# Patient Record
Sex: FEMALE | Race: WHITE | NOT HISPANIC OR LATINO | Employment: OTHER | URBAN - METROPOLITAN AREA
[De-identification: names, ages, dates, MRNs, and addresses within clinical notes are randomized per-mention and may not be internally consistent; named-entity substitution may affect disease eponyms.]

---

## 2017-01-09 ENCOUNTER — HOSPITAL ENCOUNTER (OUTPATIENT)
Dept: MAMMOGRAPHY | Facility: HOSPITAL | Age: 67
Discharge: HOME | End: 2017-01-09
Admitting: INTERNAL MEDICINE

## 2017-01-09 ENCOUNTER — TELEPHONE (OUTPATIENT)
Dept: FAMILY MEDICINE CLINIC | Facility: CLINIC | Age: 67
End: 2017-01-09

## 2017-01-09 ENCOUNTER — HOSPITAL ENCOUNTER (OUTPATIENT)
Dept: ULTRASOUND IMAGING | Facility: HOSPITAL | Age: 67
Discharge: HOME | End: 2017-01-09

## 2017-01-09 DIAGNOSIS — R92.8 ABNORMAL ULTRASOUND OF BREAST: Primary | ICD-10-CM

## 2017-01-09 DIAGNOSIS — Z13.9 SCREENING: ICD-10-CM

## 2017-01-09 PROCEDURE — 76642 ULTRASOUND BREAST LIMITED: CPT

## 2017-01-09 NOTE — TELEPHONE ENCOUNTER
Spoke with Matilda at [facility], [place], patient needs order for Left Breast US limited.   Order put in    ----- Message from Taty Hitchcock sent at 1/9/2017  9:57 AM EST -----  Contact: [facility]  THE PT WAS NOT SUPPOSE TO GET A SCREENING DONE UNTIL July.  PER  THE NOTES SAY SHE SHOULD HAVE A ULTRASOUND DONE TODAY.    CAN YOU PLEASE PUT IN AN ORDER FOR AN ULTRASOUND?

## 2017-04-26 ENCOUNTER — OFFICE VISIT (OUTPATIENT)
Dept: FAMILY MEDICINE CLINIC | Facility: CLINIC | Age: 67
End: 2017-04-26

## 2017-04-26 VITALS
SYSTOLIC BLOOD PRESSURE: 128 MMHG | HEART RATE: 52 BPM | RESPIRATION RATE: 18 BRPM | WEIGHT: 178.4 LBS | DIASTOLIC BLOOD PRESSURE: 70 MMHG | TEMPERATURE: 97.7 F | BODY MASS INDEX: 30.46 KG/M2 | HEIGHT: 64 IN

## 2017-04-26 DIAGNOSIS — E03.9 ACQUIRED HYPOTHYROIDISM: ICD-10-CM

## 2017-04-26 DIAGNOSIS — E78.5 HYPERLIPIDEMIA, UNSPECIFIED HYPERLIPIDEMIA TYPE: Primary | ICD-10-CM

## 2017-04-26 DIAGNOSIS — N18.2 CHRONIC KIDNEY DISEASE, STAGE II (MILD): ICD-10-CM

## 2017-04-26 LAB
ALBUMIN SERPL-MCNC: 4.5 G/DL (ref 3.5–5.2)
ALBUMIN/GLOB SERPL: 1.9 G/DL
ALP SERPL-CCNC: 49 U/L (ref 39–117)
ALT SERPL-CCNC: 17 U/L (ref 1–33)
APPEARANCE UR: CLEAR
AST SERPL-CCNC: 16 U/L (ref 1–32)
BASOPHILS # BLD AUTO: 0.03 10*3/MM3 (ref 0–0.2)
BASOPHILS NFR BLD AUTO: 0.6 % (ref 0–1.5)
BILIRUB SERPL-MCNC: 0.4 MG/DL (ref 0.1–1.2)
BILIRUB UR QL STRIP: NEGATIVE
BUN SERPL-MCNC: 20 MG/DL (ref 8–23)
BUN/CREAT SERPL: 17.4 (ref 7–25)
CALCIUM SERPL-MCNC: 10.9 MG/DL (ref 8.6–10.5)
CHLORIDE SERPL-SCNC: 108 MMOL/L (ref 98–107)
CHOLEST SERPL-MCNC: 156 MG/DL (ref 0–200)
CO2 SERPL-SCNC: 27.1 MMOL/L (ref 22–29)
COLOR UR: YELLOW
CREAT SERPL-MCNC: 1.15 MG/DL (ref 0.57–1)
EOSINOPHIL # BLD AUTO: 0.21 10*3/MM3 (ref 0–0.7)
EOSINOPHIL NFR BLD AUTO: 4.5 % (ref 0.3–6.2)
ERYTHROCYTE [DISTWIDTH] IN BLOOD BY AUTOMATED COUNT: 14.4 % (ref 11.7–13)
GLOBULIN SER CALC-MCNC: 2.4 GM/DL
GLUCOSE SERPL-MCNC: 109 MG/DL (ref 65–99)
GLUCOSE UR QL: NEGATIVE
HCT VFR BLD AUTO: 44.5 % (ref 35.6–45.5)
HDLC SERPL-MCNC: 42 MG/DL (ref 40–60)
HGB BLD-MCNC: 13.1 G/DL (ref 11.9–15.5)
HGB UR QL STRIP: NEGATIVE
IMM GRANULOCYTES # BLD: 0 10*3/MM3 (ref 0–0.03)
IMM GRANULOCYTES NFR BLD: 0 % (ref 0–0.5)
KETONES UR QL STRIP: NEGATIVE
LDLC SERPL CALC-MCNC: 76 MG/DL (ref 0–100)
LDLC/HDLC SERPL: 1.8 {RATIO}
LEUKOCYTE ESTERASE UR QL STRIP: NEGATIVE
LYMPHOCYTES # BLD AUTO: 1.95 10*3/MM3 (ref 0.9–4.8)
LYMPHOCYTES NFR BLD AUTO: 41.7 % (ref 19.6–45.3)
MCH RBC QN AUTO: 30.6 PG (ref 26.9–32)
MCHC RBC AUTO-ENTMCNC: 29.4 G/DL (ref 32.4–36.3)
MCV RBC AUTO: 104 FL (ref 80.5–98.2)
MONOCYTES # BLD AUTO: 0.34 10*3/MM3 (ref 0.2–1.2)
MONOCYTES NFR BLD AUTO: 7.3 % (ref 5–12)
NEUTROPHILS # BLD AUTO: 2.15 10*3/MM3 (ref 1.9–8.1)
NEUTROPHILS NFR BLD AUTO: 45.9 % (ref 42.7–76)
NITRITE UR QL STRIP: NEGATIVE
PH UR STRIP: 6.5 [PH] (ref 5–8)
PLATELET # BLD AUTO: 183 10*3/MM3 (ref 140–500)
POTASSIUM SERPL-SCNC: 5.3 MMOL/L (ref 3.5–5.2)
PROT SERPL-MCNC: 6.9 G/DL (ref 6–8.5)
PROT UR QL STRIP: NEGATIVE
RBC # BLD AUTO: 4.28 10*6/MM3 (ref 3.9–5.2)
SODIUM SERPL-SCNC: 147 MMOL/L (ref 136–145)
SP GR UR: 1.01 (ref 1–1.03)
TRIGL SERPL-MCNC: 191 MG/DL (ref 0–150)
TSH SERPL DL<=0.005 MIU/L-ACNC: 0.85 MIU/ML (ref 0.27–4.2)
UROBILINOGEN UR STRIP-MCNC: NORMAL MG/DL
VLDLC SERPL CALC-MCNC: 38.2 MG/DL (ref 5–40)
WBC # BLD AUTO: 4.68 10*3/MM3 (ref 4.5–10.7)

## 2017-04-26 PROCEDURE — 99214 OFFICE O/P EST MOD 30 MIN: CPT | Performed by: INTERNAL MEDICINE

## 2017-04-26 RX ORDER — LEVOTHYROXINE SODIUM 0.1 MG/1
100 TABLET ORAL DAILY
Qty: 90 TABLET | Refills: 1 | Status: SHIPPED | OUTPATIENT
Start: 2017-04-26 | End: 2017-04-29 | Stop reason: SDUPTHER

## 2017-04-26 RX ORDER — SIMVASTATIN 40 MG
40 TABLET ORAL NIGHTLY
Qty: 90 TABLET | Refills: 1 | Status: SHIPPED | OUTPATIENT
Start: 2017-04-26 | End: 2017-10-27 | Stop reason: SDUPTHER

## 2017-04-26 NOTE — PROGRESS NOTES
Subjective   Rosalina Ferrell is a 67 y.o. female. Patient is here today for   Chief Complaint   Patient presents with   • Hospital Follow Up     in [place] - [facility]           Vitals:    04/26/17 0837   BP: 128/70   Pulse: 52   Resp: 18   Temp: 97.7 °F (36.5 °C)     The following portions of the patient's history were reviewed and updated as appropriate: allergies, current medications, past family history, past medical history, past social history, past surgical history and problem list.    Past Medical History:   Diagnosis Date   • Hyperlipidemia    • Hypothyroidism    • Urinary incontinence       No Known Allergies   Social History     Social History   • Marital status:      Spouse name: N/A   • Number of children: N/A   • Years of education: N/A     Occupational History   • Not on file.     Social History Main Topics   • Smoking status: Never Smoker   • Smokeless tobacco: Not on file   • Alcohol use No   • Drug use: Not on file   • Sexual activity: Not on file     Other Topics Concern   • Not on file     Social History Narrative        Current Outpatient Prescriptions:   •  aspirin 81 MG tablet, Take 81 mg by mouth Daily., Disp: , Rfl:   •  divalproex (DEPAKOTE) 250 MG EC tablet, Take by mouth., Disp: , Rfl:   •  divalproex (DEPAKOTE) 500 MG 24 hr tablet, Take 1.5 tablets by mouth., Disp: , Rfl:   •  levothyroxine (SYNTHROID, LEVOTHROID) 100 MCG tablet, Take 1 tablet by mouth Daily., Disp: 90 tablet, Rfl: 1  •  lithium (ESKALITH) 450 MG CR tablet, Take by mouth., Disp: , Rfl:   •  lithium (LITHOBID) 300 MG CR tablet, Take by mouth., Disp: , Rfl:   •  pantoprazole (PROTONIX) 40 MG EC tablet, Take 1 tablet by mouth Daily., Disp: 30 tablet, Rfl: 0  •  propranolol (INDERAL) 40 MG tablet, Take by mouth., Disp: , Rfl:   •  simvastatin (ZOCOR) 40 MG tablet, Take 1 tablet by mouth Every Night., Disp: 90 tablet, Rfl: 1     Objective     History of Present Illness Rosalina was admitted to the hospital with a viral  respiratory infection and dehydration last December when she was out of town.  She recovered without incident.  She has hypothyroidism, chronic kidney disease, gastroesophageal reflux, hypertension, hyperlipidemia, and bipolar illness on lithium and Depakote.  She does see psychiatry regularly and gets labs done.  She reports stable blood pressure readings.  She tries to eat healthy and is physically active.  She does complain of frequent urination and states that she drinks 3 L of water a day.  She gets up to urinate 10 times a night.  She did see urology a couple years ago who diagnosed her with a neurogenic bladder.  She did start try some Mybetric which did not help.    Review of Systems   Constitutional: Negative for activity change and unexpected weight change.   Respiratory: Negative.    Cardiovascular: Negative.    Genitourinary: Positive for frequency.       Physical Exam   Constitutional: She appears well-developed and well-nourished.   Neck: Neck supple. No thyromegaly present.   Cardiovascular: Normal rate, regular rhythm and normal heart sounds.    Pulmonary/Chest: Effort normal and breath sounds normal.   Neurological: She is alert.   Psychiatric: She has a normal mood and affect.   Vitals reviewed.      ASSESSMENT     Problem List Items Addressed This Visit        Cardiovascular and Mediastinum    Hyperlipidemia - Primary    Relevant Medications    simvastatin (ZOCOR) 40 MG tablet    Other Relevant Orders    Lipid Panel With LDL / HDL Ratio       Endocrine    Hypothyroidism    Relevant Medications    levothyroxine (SYNTHROID, LEVOTHROID) 100 MCG tablet    Other Relevant Orders    TSH       Genitourinary    Chronic kidney disease, stage II (mild)    Relevant Orders    CBC & Differential    Comprehensive Metabolic Panel    Urinalysis With / Microscopic If Indicated          PLAN  Patient Instructions   As you're fasting will check some labs today.  Discussed urinary frequency.  Will review your previous  urologic workup.  Suggest that you decrease your water intake to before bedtime and limit your water intake at nighttime.    Return in about 6 months (around 10/26/2017) for labs CBC, CMP, TSH, lipid, urinalysis.

## 2017-04-26 NOTE — PATIENT INSTRUCTIONS
As you're fasting will check some labs today.  Discussed urinary frequency.  Will review your previous urologic workup.  Suggest that you decrease your water intake to before bedtime and limit your water intake at nighttime.

## 2017-04-28 ENCOUNTER — TELEPHONE (OUTPATIENT)
Dept: FAMILY MEDICINE CLINIC | Facility: CLINIC | Age: 67
End: 2017-04-28

## 2017-04-28 NOTE — TELEPHONE ENCOUNTER
I called patient and notified her, per Dr. Stoddard, that her labs were stable and she just needed a recheck in 6 months.  Obie understood.

## 2017-05-01 RX ORDER — LEVOTHYROXINE SODIUM 0.1 MG/1
TABLET ORAL
Qty: 90 TABLET | Refills: 1 | Status: SHIPPED | OUTPATIENT
Start: 2017-05-01 | End: 2017-12-12 | Stop reason: SDUPTHER

## 2017-06-28 ENCOUNTER — HOSPITAL ENCOUNTER (OUTPATIENT)
Dept: MAMMOGRAPHY | Facility: HOSPITAL | Age: 67
Discharge: HOME | End: 2017-06-28
Admitting: INTERNAL MEDICINE

## 2017-06-28 PROCEDURE — G0202 SCR MAMMO BI INCL CAD: HCPCS

## 2017-10-31 RX ORDER — SIMVASTATIN 40 MG
TABLET ORAL
Qty: 90 TABLET | Refills: 0 | Status: SHIPPED | OUTPATIENT
Start: 2017-10-31 | End: 2017-12-12 | Stop reason: SDUPTHER

## 2017-11-03 ENCOUNTER — OFFICE VISIT (OUTPATIENT)
Dept: FAMILY MEDICINE CLINIC | Facility: CLINIC | Age: 67
End: 2017-11-03

## 2017-11-03 VITALS
BODY MASS INDEX: 31.34 KG/M2 | WEIGHT: 183.6 LBS | RESPIRATION RATE: 16 BRPM | HEIGHT: 64 IN | TEMPERATURE: 98.3 F | SYSTOLIC BLOOD PRESSURE: 120 MMHG | DIASTOLIC BLOOD PRESSURE: 72 MMHG | HEART RATE: 58 BPM | OXYGEN SATURATION: 98 %

## 2017-11-03 DIAGNOSIS — Z78.0 POST-MENOPAUSAL: ICD-10-CM

## 2017-11-03 DIAGNOSIS — Z00.00 MEDICARE ANNUAL WELLNESS VISIT, SUBSEQUENT: Primary | ICD-10-CM

## 2017-11-03 PROCEDURE — G0439 PPPS, SUBSEQ VISIT: HCPCS | Performed by: NURSE PRACTITIONER

## 2017-11-03 RX ORDER — INFLUENZA A VIRUS A/MICHIGAN/45/2015 X-275 (H1N1) ANTIGEN (FORMALDEHYDE INACTIVATED), INFLUENZA A VIRUS A/SINGAPORE/INFIMH-16-0019/2016 IVR-186 (H3N2) ANTIGEN (FORMALDEHYDE INACTIVATED), AND INFLUENZA B VIRUS B/MARYLAND/15/2016 BX-69A (A B/COLORADO/6/2017-LIKE VIRUS) ANTIGEN (FORMALDEHYDE INACTIVATED) 60; 60; 60 UG/.5ML; UG/.5ML; UG/.5ML
INJECTION, SUSPENSION INTRAMUSCULAR
COMMUNITY
Start: 2017-10-07 | End: 2018-02-12

## 2017-11-03 NOTE — PROGRESS NOTES
QUICK REFERENCE INFORMATION:  The ABCs of the Annual Wellness Visit    Subsequent Medicare Wellness Visit    HEALTH RISK ASSESSMENT    1950    Recent Hospitalizations:  Recently treated at the following:  Other: [place], dehydration, r/o stroke .        Current Medical Providers:  Patient Care Team:  Kavon Wheat MD as PCP - General (Internal Medicine)        Smoking Status:  History   Smoking Status   • Never Smoker   Smokeless Tobacco   • Never Used       Alcohol Consumption:  History   Alcohol Use No       Depression Screen:   PHQ-2/PHQ-9 Depression Screening 11/3/2017   Little interest or pleasure in doing things 0   Feeling down, depressed, or hopeless 0   Trouble falling or staying asleep, or sleeping too much 0   Feeling tired or having little energy 1   Poor appetite or overeating 0   Feeling bad about yourself - or that you are a failure or have let yourself or your family down 0   Trouble concentrating on things, such as reading the newspaper or watching television 0   Moving or speaking so slowly that other people could have noticed. Or the opposite - being so fidgety or restless that you have been moving around a lot more than usual 0   Thoughts that you would be better off dead, or of hurting yourself in some way 0   Total Score 1       Health Habits and Functional and Cognitive Screening:  Functional & Cognitive Status 11/3/2017   Do you have difficulty preparing food and eating? No   Do you have difficulty bathing yourself, getting dressed or grooming yourself? No   Do you have difficulty using the toilet? No   Do you have difficulty moving around from place to place? No   Do you have trouble with steps or getting out of a bed or a chair? No   In the past year have you fallen or experienced a near fall? No   Current Diet Well Balanced Diet   Dental Exam Up to date   Eye Exam Up to date   Exercise (times per week) 2 times per week   Current Exercise Activities Include Walking   Do you need  help using the phone?  No   Are you deaf or do you have serious difficulty hearing?  No   Do you need help with transportation? No   Do you need help shopping? No   Do you need help preparing meals?  No   Do you need help with housework?  No   Do you need help with laundry? No   Do you need help taking your medications? No   Do you need help managing money? No   Have you felt unusual stress, anger or loneliness in the last month? No   Who do you live with? Spouse   If you need help, do you have trouble finding someone available to you? No   Have you been bothered in the last four weeks by sexual problems? No   Do you have difficulty concentrating, remembering or making decisions? No           Does the patient have evidence of cognitive impairment? No    Aspirin use counseling: Taking ASA appropriately as indicated      Recent Lab Results:  CMP:  Lab Results   Component Value Date     (H) 04/26/2017    BUN 20 04/26/2017    CREATININE 1.15 (H) 04/26/2017    EGFRIFNONA 47 (L) 04/26/2017    EGFRIFAFRI 57 (L) 04/26/2017    BCR 17.4 04/26/2017     (H) 04/26/2017    K 5.3 (H) 04/26/2017    CO2 27.1 04/26/2017    CALCIUM 10.9 (H) 04/26/2017    PROTENTOTREF 6.9 04/26/2017    ALBUMIN 4.50 04/26/2017    LABGLOBREF 2.4 04/26/2017    LABIL2 1.9 04/26/2017    BILITOT 0.4 04/26/2017    ALKPHOS 49 04/26/2017    AST 16 04/26/2017    ALT 17 04/26/2017     Lipid Panel:  Lab Results   Component Value Date    TRIG 191 (H) 04/26/2017    HDL 42 04/26/2017    VLDL 38.2 04/26/2017    LDLHDL 1.80 04/26/2017     HbA1c:  Lab Results   Component Value Date    HGBA1C 5.4 04/14/2016       Visual Acuity:  No exam data present    Age-appropriate Screening Schedule:  Refer to the list below for future screening recommendations based on patient's age, sex and/or medical conditions. Orders for these recommended tests are listed in the plan section. The patient has been provided with a written plan.    Health Maintenance   Topic Date Due    • TDAP/TD VACCINES (1 - Tdap) 01/04/1967   • PNEUMOCOCCAL VACCINES (65+ LOW/MEDIUM RISK) (2 of 2 - PPSV23) 01/14/2015   • ZOSTER VACCINE  04/06/2016   • DXA SCAN  12/12/2016   • LIPID PANEL  04/26/2018   • MAMMOGRAM  06/28/2019   • COLONOSCOPY  03/18/2024   • INFLUENZA VACCINE  Completed        Subjective   History of Present Illness    Rosalina Ferrell is a 67 y.o. female who presents for an Subsequent Wellness Visit.    The following portions of the patient's history were reviewed and updated as appropriate: allergies, current medications, past family history, past medical history, past social history, past surgical history and problem list.    Outpatient Medications Prior to Visit   Medication Sig Dispense Refill   • aspirin 81 MG tablet Take 81 mg by mouth Daily.     • divalproex (DEPAKOTE) 250 MG EC tablet Take by mouth.     • levothyroxine (SYNTHROID, LEVOTHROID) 100 MCG tablet TAKE 1 TABLET EVERY MORNING 90 tablet 1   • lithium (ESKALITH) 450 MG CR tablet Take by mouth.     • lithium (LITHOBID) 300 MG CR tablet Take by mouth.     • propranolol (INDERAL) 40 MG tablet Take by mouth.     • simvastatin (ZOCOR) 40 MG tablet TAKE 1 TABLET EVERY NIGHT 90 tablet 0   • divalproex (DEPAKOTE) 500 MG 24 hr tablet Take 1.5 tablets by mouth.     • pantoprazole (PROTONIX) 40 MG EC tablet Take 1 tablet by mouth Daily. 30 tablet 0     No facility-administered medications prior to visit.        Patient Active Problem List   Diagnosis   • Vitamin D deficiency   • Urinary incontinence   • Compression fracture of vertebral column   • Hypothyroidism   • Impaired glucose tolerance   • Pain in thoracic spine   • Osteopenia   • Lateral epicondylitis   • Hypernatremia   • Hyperlipidemia   • Hypertonicity of bladder   • Impaired fasting glucose   • Elevated aspartate aminotransferase level   • Elevated alanine aminotransferase (ALT) level   • Chronic kidney disease, stage II (mild)   • Cholelithiasis   • Bipolar I disorder, single manic  "episode   • Angular cheilitis   • Generalized abdominal pain   • Gastroesophageal reflux disease without esophagitis       Advance Care Planning:  has an advance directive - a copy HAS NOT been provided. Have asked the patient to send this to us to add to record.    Identification of Risk Factors:  Risk factors include: cardiovascular risk and increased fall risk.    Review of Systems    Compared to one year ago, the patient feels her physical health is the same.  Compared to one year ago, the patient feels her mental health is the same.    Objective     Physical Exam    Vitals:    11/03/17 1324   BP: 120/72   Pulse: 58   Resp: 16   Temp: 98.3 °F (36.8 °C)   TempSrc: Oral   SpO2: 98%   Weight: 183 lb 9.6 oz (83.3 kg)   Height: 64\" (162.6 cm)       Body mass index is 31.51 kg/(m^2).  Discussed the patient's BMI with her. The BMI is above average; BMI management plan is completed.    Assessment/Plan   Patient Self-Management and Personalized Health Advice  The patient has been provided with information about: diet, exercise, weight management, prevention of cardiac or vascular disease and fall prevention and preventive services including:   · Advance directive, Zostavax vaccine (Herpes Zoster), dates of pneumonia vaccine from [facility] .    Visit Diagnoses:    ICD-10-CM ICD-9-CM   1. Medicare annual wellness visit, subsequent Z00.00 V70.0   2. Post-menopausal Z78.0 V49.81       Orders Placed This Encounter   Procedures   • DEXA Bone Density Axial     Order Specific Question:   Reason for Exam:     Answer:   screening       Outpatient Encounter Prescriptions as of 11/3/2017   Medication Sig Dispense Refill   • aspirin 81 MG tablet Take 81 mg by mouth Daily.     • divalproex (DEPAKOTE) 250 MG EC tablet Take by mouth.     • FLUZONE HIGH-DOSE 0.5 ML suspension prefilled syringe injection      • levothyroxine (SYNTHROID, LEVOTHROID) 100 MCG tablet TAKE 1 TABLET EVERY MORNING 90 tablet 1   • lithium (ESKALITH) 450 MG CR " tablet Take by mouth.     • lithium (LITHOBID) 300 MG CR tablet Take by mouth.     • propranolol (INDERAL) 40 MG tablet Take by mouth.     • simvastatin (ZOCOR) 40 MG tablet TAKE 1 TABLET EVERY NIGHT 90 tablet 0   • [DISCONTINUED] divalproex (DEPAKOTE) 500 MG 24 hr tablet Take 1.5 tablets by mouth.     • [DISCONTINUED] pantoprazole (PROTONIX) 40 MG EC tablet Take 1 tablet by mouth Daily. 30 tablet 0     No facility-administered encounter medications on file as of 11/3/2017.        Reviewed use of high risk medication in the elderly: no  Reviewed for potential of harmful drug interactions in the elderly: not applicable    Follow Up:  Return for Recheck, with labs fasting labs Dr Wheat next available.     An After Visit Summary and PPPS with all of these plans were given to the patient.

## 2017-11-09 DIAGNOSIS — E03.9 ACQUIRED HYPOTHYROIDISM: ICD-10-CM

## 2017-11-09 DIAGNOSIS — N18.2 CHRONIC KIDNEY DISEASE, STAGE II (MILD): ICD-10-CM

## 2017-11-09 DIAGNOSIS — E78.5 HYPERLIPIDEMIA, UNSPECIFIED HYPERLIPIDEMIA TYPE: ICD-10-CM

## 2017-11-10 ENCOUNTER — HOSPITAL ENCOUNTER (OUTPATIENT)
Dept: BONE DENSITY | Facility: HOSPITAL | Age: 67
Discharge: HOME | End: 2017-11-10
Admitting: NURSE PRACTITIONER

## 2017-11-10 PROCEDURE — 77080 DXA BONE DENSITY AXIAL: CPT

## 2017-11-14 LAB
ALBUMIN SERPL-MCNC: 4.7 G/DL (ref 3.5–5.2)
ALBUMIN/GLOB SERPL: 1.9 G/DL
ALP SERPL-CCNC: 64 U/L (ref 39–117)
ALT SERPL-CCNC: 33 U/L (ref 1–33)
APPEARANCE UR: CLEAR
AST SERPL-CCNC: 21 U/L (ref 1–32)
BACTERIA #/AREA URNS HPF: ABNORMAL /HPF
BASOPHILS # BLD AUTO: 0.02 10*3/MM3 (ref 0–0.2)
BASOPHILS NFR BLD AUTO: 0.4 % (ref 0–1.5)
BILIRUB SERPL-MCNC: 0.4 MG/DL (ref 0.1–1.2)
BILIRUB UR QL STRIP: NEGATIVE
BUN SERPL-MCNC: 21 MG/DL (ref 8–23)
BUN/CREAT SERPL: 16.4 (ref 7–25)
CALCIUM SERPL-MCNC: 11.2 MG/DL (ref 8.6–10.5)
CASTS URNS MICRO: ABNORMAL
CHLORIDE SERPL-SCNC: 108 MMOL/L (ref 98–107)
CHOLEST SERPL-MCNC: 176 MG/DL (ref 0–200)
CO2 SERPL-SCNC: 28.4 MMOL/L (ref 22–29)
COLOR UR: YELLOW
CREAT SERPL-MCNC: 1.28 MG/DL (ref 0.57–1)
DIFFERENTIAL COMMENT: NORMAL
EOSINOPHIL # BLD AUTO: 0.19 10*3/MM3 (ref 0–0.7)
EOSINOPHIL NFR BLD AUTO: 3.8 % (ref 0.3–6.2)
EPI CELLS #/AREA URNS HPF: ABNORMAL /HPF
ERYTHROCYTE [DISTWIDTH] IN BLOOD BY AUTOMATED COUNT: 14 % (ref 11.7–13)
GFR SERPLBLD CREATININE-BSD FMLA CKD-EPI: 41 ML/MIN/1.73
GFR SERPLBLD CREATININE-BSD FMLA CKD-EPI: 50 ML/MIN/1.73
GLOBULIN SER CALC-MCNC: 2.5 GM/DL
GLUCOSE SERPL-MCNC: 125 MG/DL (ref 65–99)
GLUCOSE UR QL: NEGATIVE
HCT VFR BLD AUTO: 46.3 % (ref 35.6–45.5)
HDLC SERPL-MCNC: 41 MG/DL (ref 40–60)
HGB BLD-MCNC: 13.7 G/DL (ref 11.9–15.5)
HGB UR QL STRIP: (no result)
IMM GRANULOCYTES # BLD: 0 10*3/MM3 (ref 0–0.03)
IMM GRANULOCYTES NFR BLD: 0 % (ref 0–0.5)
KETONES UR QL STRIP: NEGATIVE
LDLC SERPL CALC-MCNC: 99 MG/DL (ref 0–100)
LDLC/HDLC SERPL: 2.41 {RATIO}
LEUKOCYTE ESTERASE UR QL STRIP: (no result)
LYMPHOCYTES # BLD AUTO: 1.81 10*3/MM3 (ref 0.9–4.8)
LYMPHOCYTES NFR BLD AUTO: 36.3 % (ref 19.6–45.3)
MCH RBC QN AUTO: 31.5 PG (ref 26.9–32)
MCHC RBC AUTO-ENTMCNC: 29.6 G/DL (ref 32.4–36.3)
MCV RBC AUTO: 106.4 FL (ref 80.5–98.2)
MONOCYTES # BLD AUTO: 0.33 10*3/MM3 (ref 0.2–1.2)
MONOCYTES NFR BLD AUTO: 6.6 % (ref 5–12)
NEUTROPHILS # BLD AUTO: 2.64 10*3/MM3 (ref 1.9–8.1)
NEUTROPHILS NFR BLD AUTO: 52.9 % (ref 42.7–76)
NITRITE UR QL STRIP: NEGATIVE
PH UR STRIP: 6.5 [PH] (ref 5–8)
PLATELET # BLD AUTO: 167 10*3/MM3 (ref 140–500)
PLATELET BLD QL SMEAR: NORMAL
POTASSIUM SERPL-SCNC: 5.4 MMOL/L (ref 3.5–5.2)
PROT SERPL-MCNC: 7.2 G/DL (ref 6–8.5)
PROT UR QL STRIP: NEGATIVE
RBC # BLD AUTO: 4.35 10*6/MM3 (ref 3.9–5.2)
RBC #/AREA URNS HPF: ABNORMAL /HPF
RBC MORPH BLD: NORMAL
SODIUM SERPL-SCNC: 148 MMOL/L (ref 136–145)
SP GR UR: 1.01 (ref 1–1.03)
TRIGL SERPL-MCNC: 181 MG/DL (ref 0–150)
TSH SERPL DL<=0.005 MIU/L-ACNC: 1.89 MIU/ML (ref 0.27–4.2)
UROBILINOGEN UR STRIP-MCNC: (no result) MG/DL
VLDLC SERPL CALC-MCNC: 36.2 MG/DL (ref 5–40)
WBC # BLD AUTO: 4.99 10*3/MM3 (ref 4.5–10.7)
WBC #/AREA URNS HPF: ABNORMAL /HPF

## 2017-11-20 ENCOUNTER — OFFICE VISIT (OUTPATIENT)
Dept: FAMILY MEDICINE CLINIC | Facility: CLINIC | Age: 67
End: 2017-11-20

## 2017-11-20 VITALS
DIASTOLIC BLOOD PRESSURE: 78 MMHG | HEIGHT: 64 IN | WEIGHT: 185 LBS | RESPIRATION RATE: 18 BRPM | SYSTOLIC BLOOD PRESSURE: 124 MMHG | BODY MASS INDEX: 31.58 KG/M2 | TEMPERATURE: 97.9 F | HEART RATE: 90 BPM | OXYGEN SATURATION: 97 %

## 2017-11-20 DIAGNOSIS — E78.5 HYPERLIPIDEMIA, UNSPECIFIED HYPERLIPIDEMIA TYPE: Primary | ICD-10-CM

## 2017-11-20 DIAGNOSIS — N18.2 CHRONIC KIDNEY DISEASE, STAGE II (MILD): ICD-10-CM

## 2017-11-20 DIAGNOSIS — E03.9 ACQUIRED HYPOTHYROIDISM: ICD-10-CM

## 2017-11-20 DIAGNOSIS — R73.01 IMPAIRED FASTING GLUCOSE: ICD-10-CM

## 2017-11-20 PROBLEM — R05.9 COUGH: Status: ACTIVE | Noted: 2017-11-20

## 2017-11-20 LAB — HBA1C MFR BLD: 6 %

## 2017-11-20 PROCEDURE — 83036 HEMOGLOBIN GLYCOSYLATED A1C: CPT | Performed by: INTERNAL MEDICINE

## 2017-11-20 PROCEDURE — 99214 OFFICE O/P EST MOD 30 MIN: CPT | Performed by: INTERNAL MEDICINE

## 2017-11-20 NOTE — PROGRESS NOTES
Subjective   Rosalina Ferrell is a 67 y.o. female. Patient is here today for   Chief Complaint   Patient presents with   • Hyperlipidemia   • Hypothyroidism   • Cough          Vitals:    11/20/17 1312   BP: 124/78   Pulse: 90   Resp: 18   Temp: 97.9 °F (36.6 °C)   SpO2: 97%       The following portions of the patient's history were reviewed and updated as appropriate: allergies, current medications, past family history, past medical history, past social history, past surgical history and problem list.    Past Medical History:   Diagnosis Date   • Hyperlipidemia    • Hypothyroidism    • Urinary incontinence       No Known Allergies   Social History     Social History   • Marital status:      Spouse name: N/A   • Number of children: N/A   • Years of education: N/A     Occupational History   • Not on file.     Social History Main Topics   • Smoking status: Never Smoker   • Smokeless tobacco: Never Used   • Alcohol use No   • Drug use: Not on file   • Sexual activity: Not on file     Other Topics Concern   • Not on file     Social History Narrative        Current Outpatient Prescriptions:   •  aspirin 81 MG tablet, Take 81 mg by mouth Daily., Disp: , Rfl:   •  divalproex (DEPAKOTE) 250 MG EC tablet, Take by mouth., Disp: , Rfl:   •  FLUZONE HIGH-DOSE 0.5 ML suspension prefilled syringe injection, , Disp: , Rfl:   •  levothyroxine (SYNTHROID, LEVOTHROID) 100 MCG tablet, TAKE 1 TABLET EVERY MORNING, Disp: 90 tablet, Rfl: 1  •  lithium (ESKALITH) 450 MG CR tablet, Take by mouth., Disp: , Rfl:   •  lithium (LITHOBID) 300 MG CR tablet, Take by mouth., Disp: , Rfl:   •  propranolol (INDERAL) 40 MG tablet, Take by mouth., Disp: , Rfl:   •  simvastatin (ZOCOR) 40 MG tablet, TAKE 1 TABLET EVERY NIGHT, Disp: 90 tablet, Rfl: 0     Objective   History of Present Illness Rosalina is here today for a blood pressure and lab follow-up.  She has hypertension, hyperlipidemia, hypothyroidism, and elevated fasting blood sugar.  She also has  bipolar illness and sees psychiatry.  She is on Depakote and lithium.  She tries to eat healthy and does exercise on a regular basis.  Her weight has increased 10 pounds in the last 6 months.  Today she also complains of a upper respiratory infection that started a week ago. She has nasal and sinus congestion and a cough.  She has not been taking any over-the-counter medicines.    Review of Systems   Constitutional: Negative for chills and fever.        Weight gain   HENT: Positive for sneezing.    Respiratory: Positive for cough, shortness of breath and wheezing.    Cardiovascular: Negative.    Neurological: Negative for weakness.       Physical Exam   Constitutional: She appears well-developed and well-nourished.   HENT:   Nose: Nose normal.   Mouth/Throat: Oropharynx is clear and moist.   Cardiovascular: Normal rate, regular rhythm and normal heart sounds.    Pulmonary/Chest: Effort normal. She has no wheezes. She has no rales.   Lymphadenopathy:     She has no cervical adenopathy.   Psychiatric: She has a normal mood and affect.   Vitals reviewed.      ASSESSMENT     Problem List Items Addressed This Visit        Cardiovascular and Mediastinum    Hyperlipidemia - Primary       Endocrine    Hypothyroidism    Impaired fasting glucose    Relevant Orders    POC Glycated Hemoglobin, Total (Completed)       Genitourinary    Chronic kidney disease, stage II (mild)          PLAN  Patient Instructions   Your blood pressure is stable.  Fasting blood sugar is elevated at 125 and hemoglobin A1c is 6.0 which is prediabetes.  Serum creatinine is elevated at 1.28 and GFR is 41.  Total cholesterol is 176 and triglycerides are mildly elevated at 181.  HDL is 41 and LDL is Related at 99.  Hemoglobin is normal at 13.7 but mean corpuscular volume is elevated at 106.4.  Thyroid-stimulating hormone level is normal.  We'll also check a B12 folate level.  Discussed diet, exercise, weight loss per ADA guidelines.  Suggest decongestants  for your upper respiratory infection.  If symptoms do not resolve start cefuroxime 250 mg twice a day.      Return in about 3 months (around 2/20/2018) for labsBMP,LIPID,A1C,UA,TSH.

## 2017-11-20 NOTE — PATIENT INSTRUCTIONS
Your blood pressure is stable.  Fasting blood sugar is elevated at 125 and hemoglobin A1c is 6.0 which is prediabetes.  Serum creatinine is elevated at 1.28 and GFR is 41.  Total cholesterol is 176 and triglycerides are mildly elevated at 181.  HDL is 41 and LDL is Related at 99.  Hemoglobin is normal at 13.7 but mean corpuscular volume is elevated at 106.4.  Thyroid-stimulating hormone level is normal.  We'll also check a B12 folate level.  Discussed diet, exercise, weight loss per ADA guidelines.  Suggest decongestants for your upper respiratory infection.  If symptoms do not resolve start cefuroxime 250 mg twice a day.

## 2017-12-12 RX ORDER — LEVOTHYROXINE SODIUM 0.1 MG/1
TABLET ORAL
Qty: 90 TABLET | Refills: 1 | Status: SHIPPED | OUTPATIENT
Start: 2017-12-12 | End: 2018-06-05 | Stop reason: SDUPTHER

## 2017-12-12 RX ORDER — SIMVASTATIN 40 MG
TABLET ORAL
Qty: 90 TABLET | Refills: 1 | Status: SHIPPED | OUTPATIENT
Start: 2017-12-12 | End: 2018-06-05 | Stop reason: SDUPTHER

## 2018-01-03 DIAGNOSIS — N18.2 CHRONIC KIDNEY DISEASE, STAGE II (MILD): ICD-10-CM

## 2018-01-03 DIAGNOSIS — E03.9 ACQUIRED HYPOTHYROIDISM: ICD-10-CM

## 2018-01-09 LAB
FOLATE SERPL-MCNC: 12.81 NG/ML (ref 4.78–24.2)
VIT B12 SERPL-MCNC: 509 PG/ML (ref 211–946)

## 2018-02-12 ENCOUNTER — OFFICE VISIT (OUTPATIENT)
Dept: FAMILY MEDICINE CLINIC | Facility: CLINIC | Age: 68
End: 2018-02-12

## 2018-02-12 VITALS
HEIGHT: 64 IN | DIASTOLIC BLOOD PRESSURE: 70 MMHG | WEIGHT: 180 LBS | SYSTOLIC BLOOD PRESSURE: 130 MMHG | RESPIRATION RATE: 18 BRPM | BODY MASS INDEX: 30.73 KG/M2 | HEART RATE: 53 BPM | TEMPERATURE: 98.1 F

## 2018-02-12 DIAGNOSIS — R44.3 HALLUCINATIONS: ICD-10-CM

## 2018-02-12 DIAGNOSIS — R25.1 TREMOR: Primary | ICD-10-CM

## 2018-02-12 PROCEDURE — 99213 OFFICE O/P EST LOW 20 MIN: CPT | Performed by: INTERNAL MEDICINE

## 2018-02-12 RX ORDER — RISPERIDONE 0.5 MG/1
TABLET ORAL
COMMUNITY
Start: 2018-01-25 | End: 2018-02-12

## 2018-02-12 NOTE — PROGRESS NOTES
Subjective   Rosalina Ferrell is a 68 y.o. female. Patient is here today for   Chief Complaint   Patient presents with   • Hallucinations     on and off for 6 months    • Altered Mental Status          Vitals:    02/12/18 1435   BP: 130/70   Pulse: 53   Resp: 18   Temp: 98.1 °F (36.7 °C)     The following portions of the patient's history were reviewed and updated as appropriate: allergies, current medications, past family history, past medical history, past social history, past surgical history and problem list.    Past Medical History:   Diagnosis Date   • Hyperlipidemia    • Hypothyroidism    • Urinary incontinence       No Known Allergies   Social History     Social History   • Marital status:      Spouse name: N/A   • Number of children: N/A   • Years of education: N/A     Occupational History   • Not on file.     Social History Main Topics   • Smoking status: Never Smoker   • Smokeless tobacco: Never Used   • Alcohol use No   • Drug use: Not on file   • Sexual activity: Not on file     Other Topics Concern   • Not on file     Social History Narrative        Current Outpatient Prescriptions:   •  aspirin 81 MG tablet, Take 81 mg by mouth Daily., Disp: , Rfl:   •  divalproex (DEPAKOTE) 250 MG EC tablet, Take by mouth., Disp: , Rfl:   •  levothyroxine (SYNTHROID, LEVOTHROID) 100 MCG tablet, TAKE 1 TABLET EVERY MORNING, Disp: 90 tablet, Rfl: 1  •  lithium (ESKALITH) 450 MG CR tablet, Take by mouth., Disp: , Rfl:   •  lithium (LITHOBID) 300 MG CR tablet, Take by mouth., Disp: , Rfl:   •  propranolol (INDERAL) 40 MG tablet, Take by mouth., Disp: , Rfl:   •  simvastatin (ZOCOR) 40 MG tablet, TAKE 1 TABLET EVERY NIGHT, Disp: 90 tablet, Rfl: 1     Objective     History of Present Illness Rosalina is here with her  and son.  She complains of a tremor and has been having some hallucinations over the last 6 months.  She thought somebody was in her house and went next door and called the police.  She does see a  psychiatric nurse practitioner and is on lithium and Depakote.  The nurse practitioner suggested that she see a neurologist.  She also has a hand tremor.  Been treating her for hypothyroidism, hypertension, elevated fasting glucose, chronic kidney disease, and hyperlipidemia.    Review of Systems   Constitutional: Negative.    Respiratory: Negative.    Cardiovascular: Negative.    Neurological: Positive for tremors.   Psychiatric/Behavioral: Positive for hallucinations. Negative for agitation, behavioral problems, confusion and dysphoric mood. The patient is not nervous/anxious.        Physical Exam   Constitutional: She appears well-developed and well-nourished.   Cardiovascular: Normal rate, regular rhythm and normal heart sounds.    Neurological: She displays normal reflexes. No cranial nerve deficit. Coordination and gait normal.   There is a bilateral hand tremor and tongue tremor.  There is no cogwheeling    Psychiatric: She has a normal mood and affect.   Vitals reviewed.      ASSESSMENT     Problem List Items Addressed This Visit        Other    Tremor - Primary    Relevant Orders    Ambulatory Referral to Neurology    Hallucinations    Relevant Orders    Ambulatory Referral to Neurology          PLAN  Patient Instructions   Will refer to neurology for further evaluation.    Return for Next scheduled follow up.

## 2018-05-08 DIAGNOSIS — E78.5 HYPERLIPIDEMIA, UNSPECIFIED HYPERLIPIDEMIA TYPE: ICD-10-CM

## 2018-05-08 DIAGNOSIS — E03.9 ACQUIRED HYPOTHYROIDISM: ICD-10-CM

## 2018-05-08 DIAGNOSIS — R73.01 IMPAIRED FASTING GLUCOSE: ICD-10-CM

## 2018-05-21 LAB
APPEARANCE UR: CLEAR
BACTERIA #/AREA URNS HPF: ABNORMAL /HPF
BILIRUB UR QL STRIP: NEGATIVE
BUN SERPL-MCNC: 19 MG/DL (ref 8–23)
BUN/CREAT SERPL: 13.8 (ref 7–25)
CALCIUM SERPL-MCNC: 10.5 MG/DL (ref 8.6–10.5)
CASTS URNS MICRO: ABNORMAL
CHLORIDE SERPL-SCNC: 107 MMOL/L (ref 98–107)
CHOLEST SERPL-MCNC: 138 MG/DL (ref 0–200)
CO2 SERPL-SCNC: 26.7 MMOL/L (ref 22–29)
COLOR UR: YELLOW
CREAT SERPL-MCNC: 1.38 MG/DL (ref 0.57–1)
EPI CELLS #/AREA URNS HPF: ABNORMAL /HPF
GFR SERPLBLD CREATININE-BSD FMLA CKD-EPI: 38 ML/MIN/1.73
GFR SERPLBLD CREATININE-BSD FMLA CKD-EPI: 46 ML/MIN/1.73
GLUCOSE SERPL-MCNC: 93 MG/DL (ref 65–99)
GLUCOSE UR QL: NEGATIVE
HBA1C MFR BLD: 4.9 % (ref 4.8–5.6)
HDLC SERPL-MCNC: 36 MG/DL (ref 40–60)
HGB UR QL STRIP: NEGATIVE
KETONES UR QL STRIP: NEGATIVE
LDLC SERPL CALC-MCNC: 74 MG/DL (ref 0–100)
LDLC/HDLC SERPL: 2.06 {RATIO}
LEUKOCYTE ESTERASE UR QL STRIP: (no result)
NITRITE UR QL STRIP: NEGATIVE
PH UR STRIP: 6.5 [PH] (ref 5–8)
POTASSIUM SERPL-SCNC: 4.9 MMOL/L (ref 3.5–5.2)
PROT UR QL STRIP: NEGATIVE
RBC #/AREA URNS HPF: ABNORMAL /HPF
SODIUM SERPL-SCNC: 145 MMOL/L (ref 136–145)
SP GR UR: 1.01 (ref 1–1.03)
TRIGL SERPL-MCNC: 139 MG/DL (ref 0–150)
TSH SERPL DL<=0.005 MIU/L-ACNC: 3.03 MIU/ML (ref 0.27–4.2)
UROBILINOGEN UR STRIP-MCNC: (no result) MG/DL
VLDLC SERPL CALC-MCNC: 27.8 MG/DL (ref 5–40)
WBC #/AREA URNS HPF: ABNORMAL /HPF

## 2018-05-25 ENCOUNTER — OFFICE VISIT (OUTPATIENT)
Dept: FAMILY MEDICINE CLINIC | Facility: CLINIC | Age: 68
End: 2018-05-25

## 2018-05-25 VITALS
RESPIRATION RATE: 16 BRPM | HEIGHT: 64 IN | BODY MASS INDEX: 30.05 KG/M2 | SYSTOLIC BLOOD PRESSURE: 136 MMHG | HEART RATE: 54 BPM | DIASTOLIC BLOOD PRESSURE: 74 MMHG | WEIGHT: 176 LBS

## 2018-05-25 DIAGNOSIS — E78.5 HYPERLIPIDEMIA, UNSPECIFIED HYPERLIPIDEMIA TYPE: Primary | ICD-10-CM

## 2018-05-25 DIAGNOSIS — N18.2 CHRONIC KIDNEY DISEASE, STAGE II (MILD): ICD-10-CM

## 2018-05-25 DIAGNOSIS — R73.01 IMPAIRED FASTING GLUCOSE: ICD-10-CM

## 2018-05-25 DIAGNOSIS — E03.9 ACQUIRED HYPOTHYROIDISM: ICD-10-CM

## 2018-05-25 PROCEDURE — 99214 OFFICE O/P EST MOD 30 MIN: CPT | Performed by: INTERNAL MEDICINE

## 2018-05-25 NOTE — PROGRESS NOTES
Subjective   Rosalina Ferrell is a 68 y.o. female. Patient is here today for   Chief Complaint   Patient presents with   • Hyperglycemia   • Hyperlipidemia   • Hypothyroidism          Vitals:    05/25/18 0841   BP: 136/74   Pulse: 54   Resp: 16       The following portions of the patient's history were reviewed and updated as appropriate: allergies, current medications, past family history, past medical history, past social history, past surgical history and problem list.    Past Medical History:   Diagnosis Date   • Hyperlipidemia    • Hypothyroidism    • Urinary incontinence       No Known Allergies   Social History     Social History   • Marital status:      Spouse name: N/A   • Number of children: N/A   • Years of education: N/A     Occupational History   • Not on file.     Social History Main Topics   • Smoking status: Never Smoker   • Smokeless tobacco: Never Used   • Alcohol use No   • Drug use: Unknown   • Sexual activity: Not on file     Other Topics Concern   • Not on file     Social History Narrative   • No narrative on file        Current Outpatient Prescriptions:   •  aspirin 81 MG tablet, Take 81 mg by mouth Daily., Disp: , Rfl:   •  divalproex (DEPAKOTE) 250 MG EC tablet, Take by mouth., Disp: , Rfl:   •  levothyroxine (SYNTHROID, LEVOTHROID) 100 MCG tablet, TAKE 1 TABLET EVERY MORNING, Disp: 90 tablet, Rfl: 1  •  lithium (ESKALITH) 450 MG CR tablet, Take by mouth., Disp: , Rfl:   •  lithium (LITHOBID) 300 MG CR tablet, Take by mouth., Disp: , Rfl:   •  propranolol (INDERAL) 40 MG tablet, Take by mouth., Disp: , Rfl:   •  simvastatin (ZOCOR) 40 MG tablet, TAKE 1 TABLET EVERY NIGHT, Disp: 90 tablet, Rfl: 1     Objective   History of Present Illness Rosalina is here today for a lab follow-up.  She has hypothyroidism , chronic kidney disease, and hyperlipidemia.  She eats healthy and is physically active.  She also has bipolar illness and is on lithium and Depakote.  She also has a tremor and was recently  evaluated by neurology.  She has a neuropsychological evaluation scheduled.  Today she also complains of left sided back pain that is sharp and intermittent.  She has no pain today.    Review of Systems   Constitutional: Negative for activity change.   Respiratory: Negative.    Cardiovascular: Negative.    Gastrointestinal: Negative.    Musculoskeletal: Positive for back pain.   Skin: Negative for rash.   Neurological: Positive for tremors.   Psychiatric/Behavioral: Negative for confusion.       Physical Exam   Constitutional: She appears well-developed and well-nourished.   Cardiovascular: Normal rate, regular rhythm and normal heart sounds.    130/74   Pulmonary/Chest: Effort normal and breath sounds normal.   Musculoskeletal: She exhibits no edema.   tennder left paraspinal muscle   Neurological:   Bilateral hand tremors   Skin: No rash noted.   Psychiatric: She has a normal mood and affect. Her behavior is normal. Judgment and thought content normal.   Vitals reviewed.      ASSESSMENT     Problem List Items Addressed This Visit        Cardiovascular and Mediastinum    Hyperlipidemia - Primary       Endocrine    Hypothyroidism    Impaired fasting glucose       Genitourinary    Chronic kidney disease, stage II (mild)          PLAN  Patient Instructions   At pressure is elevated today.  Discussed checking blood pressure correctly at home.  Thyroid-stimulating hormone level is normal.  Serum creatinine is elevated at 1.38 and GFR is 38.  Total cholesterol is 138.  HDL is low at 36 and LDL 74.  Discussed recent neurological evaluation.      Return in about 6 months (around 11/25/2018) for labs.

## 2018-05-25 NOTE — PATIENT INSTRUCTIONS
At pressure is elevated today.  Discussed checking blood pressure correctly at home.  Thyroid-stimulating hormone level is normal.  Serum creatinine is elevated at 1.38 and GFR is 38.  Total cholesterol is 138.  HDL is low at 36 and LDL 74.  Discussed recent neurological evaluation.

## 2018-06-04 ENCOUNTER — TRANSCRIBE ORDERS (OUTPATIENT)
Dept: ADMINISTRATIVE | Facility: HOSPITAL | Age: 68
End: 2018-06-04

## 2018-06-04 DIAGNOSIS — Z12.39 SCREENING BREAST EXAMINATION: Primary | ICD-10-CM

## 2018-06-05 ENCOUNTER — TELEPHONE (OUTPATIENT)
Dept: FAMILY MEDICINE CLINIC | Facility: CLINIC | Age: 68
End: 2018-06-05

## 2018-06-05 RX ORDER — SIMVASTATIN 40 MG
40 TABLET ORAL NIGHTLY
Qty: 90 TABLET | Refills: 1 | Status: SHIPPED | OUTPATIENT
Start: 2018-06-05 | End: 2018-06-05 | Stop reason: SDUPTHER

## 2018-06-05 RX ORDER — LEVOTHYROXINE SODIUM 0.1 MG/1
100 TABLET ORAL EVERY MORNING
Qty: 90 TABLET | Refills: 1 | Status: SHIPPED | OUTPATIENT
Start: 2018-06-05 | End: 2018-08-22 | Stop reason: SDUPTHER

## 2018-06-05 NOTE — TELEPHONE ENCOUNTER
Rx sent to pharmacy    ----- Message from Bailey Stewart sent at 6/5/2018  1:24 PM EDT -----  Pt needs rx      Levothyroxine 100 mcg # 90     Simvastatin 40 mg # 90     Needs sent to mail order  [facility]     Please call with any questions     [phone]

## 2018-06-06 RX ORDER — SIMVASTATIN 40 MG
TABLET ORAL
Qty: 90 TABLET | Refills: 1 | Status: SHIPPED | OUTPATIENT
Start: 2018-06-06 | End: 2018-08-22 | Stop reason: SDUPTHER

## 2018-07-02 ENCOUNTER — HOSPITAL ENCOUNTER (OUTPATIENT)
Dept: MAMMOGRAPHY | Facility: HOSPITAL | Age: 68
Discharge: HOME | End: 2018-07-02
Admitting: INTERNAL MEDICINE

## 2018-07-02 DIAGNOSIS — Z12.39 SCREENING BREAST EXAMINATION: ICD-10-CM

## 2018-07-02 PROCEDURE — 77067 SCR MAMMO BI INCL CAD: CPT

## 2018-08-22 RX ORDER — SIMVASTATIN 40 MG
TABLET ORAL
Qty: 90 TABLET | Refills: 1 | Status: SHIPPED | OUTPATIENT
Start: 2018-08-22 | End: 2019-02-20 | Stop reason: SDUPTHER

## 2018-08-22 RX ORDER — LEVOTHYROXINE SODIUM 0.1 MG/1
TABLET ORAL
Qty: 90 TABLET | Refills: 1 | Status: SHIPPED | OUTPATIENT
Start: 2018-08-22 | End: 2019-02-20 | Stop reason: SDUPTHER

## 2018-11-06 DIAGNOSIS — E78.5 HYPERLIPIDEMIA, UNSPECIFIED HYPERLIPIDEMIA TYPE: Primary | ICD-10-CM

## 2018-11-09 LAB
ALBUMIN SERPL-MCNC: 4.6 G/DL (ref 3.5–5.2)
ALBUMIN/GLOB SERPL: 1.8 G/DL
ALP SERPL-CCNC: 59 U/L (ref 39–117)
ALT SERPL-CCNC: 18 U/L (ref 1–33)
AST SERPL-CCNC: 13 U/L (ref 1–32)
BASOPHILS # BLD AUTO: 0.04 10*3/MM3 (ref 0–0.2)
BASOPHILS NFR BLD AUTO: 0.7 % (ref 0–1.5)
BILIRUB SERPL-MCNC: 0.2 MG/DL (ref 0.1–1.2)
BUN SERPL-MCNC: 22 MG/DL (ref 8–23)
BUN/CREAT SERPL: 16.2 (ref 7–25)
CALCIUM SERPL-MCNC: 11 MG/DL (ref 8.6–10.5)
CHLORIDE SERPL-SCNC: 111 MMOL/L (ref 98–107)
CHOLEST SERPL-MCNC: 182 MG/DL (ref 0–200)
CO2 SERPL-SCNC: 28.2 MMOL/L (ref 22–29)
CREAT SERPL-MCNC: 1.36 MG/DL (ref 0.57–1)
EOSINOPHIL # BLD AUTO: 0.28 10*3/MM3 (ref 0–0.7)
EOSINOPHIL NFR BLD AUTO: 4.9 % (ref 0.3–6.2)
ERYTHROCYTE [DISTWIDTH] IN BLOOD BY AUTOMATED COUNT: 13.7 % (ref 11.7–13)
GLOBULIN SER CALC-MCNC: 2.6 GM/DL
GLUCOSE SERPL-MCNC: 143 MG/DL (ref 65–99)
HCT VFR BLD AUTO: 45.5 % (ref 35.6–45.5)
HDLC SERPL-MCNC: 43 MG/DL (ref 40–60)
HGB BLD-MCNC: 13.4 G/DL (ref 11.9–15.5)
IMM GRANULOCYTES # BLD: 0.02 10*3/MM3 (ref 0–0.03)
IMM GRANULOCYTES NFR BLD: 0.3 % (ref 0–0.5)
LDLC SERPL CALC-MCNC: 80 MG/DL (ref 0–100)
LDLC/HDLC SERPL: 1.86 {RATIO}
LYMPHOCYTES # BLD AUTO: 2.21 10*3/MM3 (ref 0.9–4.8)
LYMPHOCYTES NFR BLD AUTO: 38.5 % (ref 19.6–45.3)
MCH RBC QN AUTO: 30.7 PG (ref 26.9–32)
MCHC RBC AUTO-ENTMCNC: 29.5 G/DL (ref 32.4–36.3)
MCV RBC AUTO: 104.1 FL (ref 80.5–98.2)
MONOCYTES # BLD AUTO: 0.46 10*3/MM3 (ref 0.2–1.2)
MONOCYTES NFR BLD AUTO: 8 % (ref 5–12)
NEUTROPHILS # BLD AUTO: 2.75 10*3/MM3 (ref 1.9–8.1)
NEUTROPHILS NFR BLD AUTO: 47.9 % (ref 42.7–76)
PLATELET # BLD AUTO: 180 10*3/MM3 (ref 140–500)
POTASSIUM SERPL-SCNC: 5.3 MMOL/L (ref 3.5–5.2)
PROT SERPL-MCNC: 7.2 G/DL (ref 6–8.5)
RBC # BLD AUTO: 4.37 10*6/MM3 (ref 3.9–5.2)
SODIUM SERPL-SCNC: 150 MMOL/L (ref 136–145)
TRIGL SERPL-MCNC: 295 MG/DL (ref 0–150)
VLDLC SERPL CALC-MCNC: 59 MG/DL (ref 5–40)
WBC # BLD AUTO: 5.74 10*3/MM3 (ref 4.5–10.7)

## 2018-11-19 ENCOUNTER — OFFICE VISIT (OUTPATIENT)
Dept: FAMILY MEDICINE CLINIC | Facility: CLINIC | Age: 68
End: 2018-11-19

## 2018-11-19 VITALS
DIASTOLIC BLOOD PRESSURE: 70 MMHG | RESPIRATION RATE: 16 BRPM | SYSTOLIC BLOOD PRESSURE: 122 MMHG | HEIGHT: 64 IN | HEART RATE: 47 BPM | WEIGHT: 186 LBS | BODY MASS INDEX: 31.76 KG/M2

## 2018-11-19 DIAGNOSIS — E87.0 HYPERNATREMIA: ICD-10-CM

## 2018-11-19 DIAGNOSIS — N18.2 CHRONIC KIDNEY DISEASE, STAGE II (MILD): ICD-10-CM

## 2018-11-19 DIAGNOSIS — E78.5 HYPERLIPIDEMIA, UNSPECIFIED HYPERLIPIDEMIA TYPE: Primary | ICD-10-CM

## 2018-11-19 DIAGNOSIS — E03.9 ACQUIRED HYPOTHYROIDISM: ICD-10-CM

## 2018-11-19 DIAGNOSIS — R73.01 IMPAIRED FASTING GLUCOSE: ICD-10-CM

## 2018-11-19 LAB
ALBUMIN SERPL-MCNC: 4.4 G/DL (ref 3.5–5.2)
ALBUMIN/GLOB SERPL: 1.7 G/DL
ALP SERPL-CCNC: 56 U/L (ref 39–117)
ALT SERPL-CCNC: 20 U/L (ref 1–33)
AST SERPL-CCNC: 13 U/L (ref 1–32)
BILIRUB SERPL-MCNC: 0.4 MG/DL (ref 0.1–1.2)
BUN SERPL-MCNC: 21 MG/DL (ref 8–23)
BUN/CREAT SERPL: 16.7 (ref 7–25)
CALCIUM SERPL-MCNC: 10.7 MG/DL (ref 8.6–10.5)
CHLORIDE SERPL-SCNC: 109 MMOL/L (ref 98–107)
CO2 SERPL-SCNC: 27.4 MMOL/L (ref 22–29)
CREAT SERPL-MCNC: 1.26 MG/DL (ref 0.57–1)
GLOBULIN SER CALC-MCNC: 2.6 GM/DL
GLUCOSE SERPL-MCNC: 116 MG/DL (ref 65–99)
HBA1C MFR BLD: 5.4 % (ref 4.8–5.6)
POTASSIUM SERPL-SCNC: 5.1 MMOL/L (ref 3.5–5.2)
PROT SERPL-MCNC: 7 G/DL (ref 6–8.5)
SODIUM SERPL-SCNC: 148 MMOL/L (ref 136–145)

## 2018-11-19 PROCEDURE — 99214 OFFICE O/P EST MOD 30 MIN: CPT | Performed by: INTERNAL MEDICINE

## 2018-11-19 NOTE — PATIENT INSTRUCTIONS
Blood pressure is high today.  Discussed monitoring it correctly at home..  Fasting blood sugar is elevated at 143.  Serum creatinine is 1.36 and GFR is 38.  Sodium is 150 and potassium is 5.3.  Total cholesterol is 182 and triglycerides are elevated at 295.  Will check some additional labs today and call you the results.

## 2018-11-19 NOTE — PROGRESS NOTES
Fatoumata Ferrell is a 68 y.o. female. Patient is here today for   Chief Complaint   Patient presents with   • Hyperlipidemia          Vitals:    11/19/18 0840   BP: 122/70   Pulse: (!) 47   Resp: 16       The following portions of the patient's history were reviewed and updated as appropriate: allergies, current medications, past family history, past medical history, past social history, past surgical history and problem list.    Past Medical History:   Diagnosis Date   • Hyperlipidemia    • Hypothyroidism    • Urinary incontinence       No Known Allergies   Social History     Socioeconomic History   • Marital status:      Spouse name: Not on file   • Number of children: Not on file   • Years of education: Not on file   • Highest education level: Not on file   Social Needs   • Financial resource strain: Not on file   • Food insecurity - worry: Not on file   • Food insecurity - inability: Not on file   • Transportation needs - medical: Not on file   • Transportation needs - non-medical: Not on file   Occupational History   • Not on file   Tobacco Use   • Smoking status: Never Smoker   • Smokeless tobacco: Never Used   Substance and Sexual Activity   • Alcohol use: No   • Drug use: Not on file   • Sexual activity: Not on file   Other Topics Concern   • Not on file   Social History Narrative   • Not on file        Current Outpatient Medications:   •  aspirin 81 MG tablet, Take 81 mg by mouth Daily., Disp: , Rfl:   •  divalproex (DEPAKOTE) 250 MG EC tablet, Take by mouth., Disp: , Rfl:   •  levothyroxine (SYNTHROID, LEVOTHROID) 100 MCG tablet, TAKE 1 TABLET EVERY MORNING, Disp: 90 tablet, Rfl: 1  •  lithium (ESKALITH) 450 MG CR tablet, Take by mouth., Disp: , Rfl:   •  lithium (LITHOBID) 300 MG CR tablet, Take by mouth., Disp: , Rfl:   •  propranolol (INDERAL) 40 MG tablet, Take by mouth., Disp: , Rfl:   •  simvastatin (ZOCOR) 40 MG tablet, TAKE 1 TABLET EVERY NIGHT, Disp: 90 tablet, Rfl: 1     Objective    History of Present Illness Rosalina is here for a blood pressure and lab follow-up.  She has hypothyroidism, hyperlipidemia, and chronic kidney disease.  She also is bipolar and is followed by psychiatry.  She is on Depakote and lithium and was recently put on Seroquel at bedtime.  She feels well.  She eats healthy and is physically active.  She has been monitoring her blood pressure reports systolic readings in the 130s.  She has gain weight in the last 6 months.    Review of Systems   Constitutional:        Weight gain 10 lbs   Respiratory: Negative.    Cardiovascular: Negative.    Neurological: Negative.    Psychiatric/Behavioral:        As in history of present illness       Physical Exam   Constitutional: She appears well-developed and well-nourished.   Neck: Carotid bruit is not present. No thyromegaly present.   Cardiovascular: Normal rate, regular rhythm and normal heart sounds.   137/74   Pulmonary/Chest: Effort normal and breath sounds normal.   Musculoskeletal: She exhibits no edema.   Psychiatric: She has a normal mood and affect. Her behavior is normal. Judgment and thought content normal.   Vitals reviewed.      ASSESSMENT     Problem List Items Addressed This Visit        Cardiovascular and Mediastinum    Hyperlipidemia - Primary       Endocrine    Hypothyroidism    Impaired fasting glucose    Relevant Orders    Comprehensive Metabolic Panel    Hemoglobin A1c       Genitourinary    Chronic kidney disease, stage II (mild)    Relevant Orders    Comprehensive Metabolic Panel       Other    Hypernatremia    Relevant Orders    Comprehensive Metabolic Panel          PLAN  Patient Instructions   Blood pressure is high today.  Discussed monitoring it correctly at home..  Fasting blood sugar is elevated at 143.  Serum creatinine is 1.36 and GFR is 38.  Sodium is 150 and potassium is 5.3.  Total cholesterol is 182 and triglycerides are elevated at 295.  Will check some additional labs today and call you the  results.      No Follow-up on file.

## 2018-11-20 ENCOUNTER — TELEPHONE (OUTPATIENT)
Dept: FAMILY MEDICINE CLINIC | Facility: CLINIC | Age: 68
End: 2018-11-20

## 2018-11-20 NOTE — TELEPHONE ENCOUNTER
CALLED PATIENT AND NOTIFIED HER, PER DR ROBERSON, THAT HER LABS LOOK SLIGHTLY BETTER. SHE NEEDS TO DRINK PLENTY OF FLUIDS.   SHE IS OK TO GO ON HER TRIP OUT OF THE COUN[place], SHE JUST NEEDS TO STAY HYDRATED.   REPEAT CMP WHEN SHE COMES BACK.  Patient understood.

## 2018-12-17 DIAGNOSIS — E78.5 HYPERLIPIDEMIA, UNSPECIFIED HYPERLIPIDEMIA TYPE: Primary | ICD-10-CM

## 2018-12-18 LAB
ALBUMIN SERPL-MCNC: 4.4 G/DL (ref 3.5–5.2)
ALBUMIN/GLOB SERPL: 2 G/DL
ALP SERPL-CCNC: 56 U/L (ref 39–117)
ALT SERPL-CCNC: 21 U/L (ref 1–33)
AST SERPL-CCNC: 19 U/L (ref 1–32)
BILIRUB SERPL-MCNC: 0.4 MG/DL (ref 0.1–1.2)
BUN SERPL-MCNC: 17 MG/DL (ref 8–23)
BUN/CREAT SERPL: 12 (ref 7–25)
CALCIUM SERPL-MCNC: 10.3 MG/DL (ref 8.6–10.5)
CHLORIDE SERPL-SCNC: 108 MMOL/L (ref 98–107)
CO2 SERPL-SCNC: 28.5 MMOL/L (ref 22–29)
CREAT SERPL-MCNC: 1.42 MG/DL (ref 0.57–1)
GLOBULIN SER CALC-MCNC: 2.2 GM/DL
GLUCOSE SERPL-MCNC: 106 MG/DL (ref 65–99)
POTASSIUM SERPL-SCNC: 4.5 MMOL/L (ref 3.5–5.2)
PROT SERPL-MCNC: 6.6 G/DL (ref 6–8.5)
SODIUM SERPL-SCNC: 145 MMOL/L (ref 136–145)

## 2019-02-08 ENCOUNTER — OFFICE VISIT (OUTPATIENT)
Dept: FAMILY MEDICINE CLINIC | Facility: CLINIC | Age: 69
End: 2019-02-08

## 2019-02-08 VITALS
TEMPERATURE: 97.6 F | WEIGHT: 178.6 LBS | HEART RATE: 54 BPM | RESPIRATION RATE: 18 BRPM | OXYGEN SATURATION: 94 % | HEIGHT: 64 IN | DIASTOLIC BLOOD PRESSURE: 74 MMHG | SYSTOLIC BLOOD PRESSURE: 120 MMHG | BODY MASS INDEX: 30.49 KG/M2

## 2019-02-08 DIAGNOSIS — Z00.00 MEDICARE ANNUAL WELLNESS VISIT, SUBSEQUENT: Primary | ICD-10-CM

## 2019-02-08 PROBLEM — G31.84 MCI (MILD COGNITIVE IMPAIRMENT): Status: ACTIVE | Noted: 2018-08-01

## 2019-02-08 PROBLEM — K59.00 CONSTIPATION: Status: ACTIVE | Noted: 2018-08-01

## 2019-02-08 PROBLEM — R44.3 HALLUCINATIONS: Status: ACTIVE | Noted: 2018-08-01

## 2019-02-08 PROBLEM — R05.9 COUGH: Status: RESOLVED | Noted: 2017-11-20 | Resolved: 2019-02-08

## 2019-02-08 PROBLEM — G20.C PARKINSONISM: Status: ACTIVE | Noted: 2018-08-01

## 2019-02-08 PROBLEM — G20 PARKINSONISM: Status: ACTIVE | Noted: 2018-08-01

## 2019-02-08 PROBLEM — G25.1 MEDICATION-INDUCED POSTURAL TREMOR: Status: ACTIVE | Noted: 2018-08-01

## 2019-02-08 PROCEDURE — G0439 PPPS, SUBSEQ VISIT: HCPCS | Performed by: NURSE PRACTITIONER

## 2019-02-08 PROCEDURE — 96160 PT-FOCUSED HLTH RISK ASSMT: CPT | Performed by: NURSE PRACTITIONER

## 2019-02-08 RX ORDER — DESONIDE 0.5 MG/G
CREAM TOPICAL 2 TIMES DAILY
COMMUNITY
End: 2020-05-19

## 2019-02-08 RX ORDER — MOMETASONE FUROATE 1 MG/ML
SOLUTION TOPICAL DAILY
COMMUNITY
End: 2020-05-19

## 2019-02-08 RX ORDER — QUETIAPINE FUMARATE 25 MG/1
TABLET, FILM COATED ORAL
COMMUNITY
Start: 2018-12-16 | End: 2020-05-19

## 2019-02-20 RX ORDER — LEVOTHYROXINE SODIUM 0.1 MG/1
TABLET ORAL
Qty: 90 TABLET | Refills: 1 | Status: SHIPPED | OUTPATIENT
Start: 2019-02-20 | End: 2019-09-23 | Stop reason: SDUPTHER

## 2019-02-20 RX ORDER — SIMVASTATIN 40 MG
TABLET ORAL
Qty: 90 TABLET | Refills: 1 | Status: SHIPPED | OUTPATIENT
Start: 2019-02-20 | End: 2019-09-23 | Stop reason: SDUPTHER

## 2019-06-13 ENCOUNTER — TRANSCRIBE ORDERS (OUTPATIENT)
Dept: ADMINISTRATIVE | Facility: HOSPITAL | Age: 69
End: 2019-06-13

## 2019-06-13 DIAGNOSIS — Z12.31 VISIT FOR SCREENING MAMMOGRAM: Primary | ICD-10-CM

## 2019-06-19 DIAGNOSIS — E03.9 ACQUIRED HYPOTHYROIDISM: ICD-10-CM

## 2019-06-19 DIAGNOSIS — E78.5 HYPERLIPIDEMIA, UNSPECIFIED HYPERLIPIDEMIA TYPE: Primary | ICD-10-CM

## 2019-06-21 LAB
ALBUMIN SERPL-MCNC: 5.1 G/DL (ref 3.5–5.2)
ALBUMIN/GLOB SERPL: 2.7 G/DL
ALP SERPL-CCNC: 54 U/L (ref 39–117)
ALT SERPL-CCNC: 13 U/L (ref 1–33)
AST SERPL-CCNC: 11 U/L (ref 1–32)
BASOPHILS # BLD AUTO: 0.02 10*3/MM3 (ref 0–0.2)
BASOPHILS NFR BLD AUTO: 0.4 % (ref 0–1.5)
BILIRUB SERPL-MCNC: 0.3 MG/DL (ref 0.2–1.2)
BUN SERPL-MCNC: 25 MG/DL (ref 8–23)
BUN/CREAT SERPL: 21.6 (ref 7–25)
CALCIUM SERPL-MCNC: 10.5 MG/DL (ref 8.6–10.5)
CHLORIDE SERPL-SCNC: 107 MMOL/L (ref 98–107)
CHOLEST SERPL-MCNC: 177 MG/DL (ref 0–200)
CO2 SERPL-SCNC: 28.3 MMOL/L (ref 22–29)
CREAT SERPL-MCNC: 1.16 MG/DL (ref 0.57–1)
EOSINOPHIL # BLD AUTO: 0.15 10*3/MM3 (ref 0–0.4)
EOSINOPHIL NFR BLD AUTO: 3.1 % (ref 0.3–6.2)
ERYTHROCYTE [DISTWIDTH] IN BLOOD BY AUTOMATED COUNT: 13 % (ref 12.3–15.4)
GLOBULIN SER CALC-MCNC: 1.9 GM/DL
GLUCOSE SERPL-MCNC: 103 MG/DL (ref 65–99)
HCT VFR BLD AUTO: 44.3 % (ref 34–46.6)
HDLC SERPL-MCNC: 44 MG/DL (ref 40–60)
HGB BLD-MCNC: 13.2 G/DL (ref 12–15.9)
IMM GRANULOCYTES # BLD AUTO: 0.01 10*3/MM3 (ref 0–0.05)
IMM GRANULOCYTES NFR BLD AUTO: 0.2 % (ref 0–0.5)
LDLC SERPL CALC-MCNC: 85 MG/DL (ref 0–100)
LDLC/HDLC SERPL: 1.94 {RATIO}
LYMPHOCYTES # BLD AUTO: 1.83 10*3/MM3 (ref 0.7–3.1)
LYMPHOCYTES NFR BLD AUTO: 38.2 % (ref 19.6–45.3)
MCH RBC QN AUTO: 30.5 PG (ref 26.6–33)
MCHC RBC AUTO-ENTMCNC: 29.8 G/DL (ref 31.5–35.7)
MCV RBC AUTO: 102.3 FL (ref 79–97)
MONOCYTES # BLD AUTO: 0.43 10*3/MM3 (ref 0.1–0.9)
MONOCYTES NFR BLD AUTO: 9 % (ref 5–12)
NEUTROPHILS # BLD AUTO: 2.35 10*3/MM3 (ref 1.7–7)
NEUTROPHILS NFR BLD AUTO: 49.1 % (ref 42.7–76)
NRBC BLD AUTO-RTO: 0 /100 WBC (ref 0–0.2)
PLATELET # BLD AUTO: 166 10*3/MM3 (ref 140–450)
POTASSIUM SERPL-SCNC: 4.9 MMOL/L (ref 3.5–5.2)
PROT SERPL-MCNC: 7 G/DL (ref 6–8.5)
RBC # BLD AUTO: 4.33 10*6/MM3 (ref 3.77–5.28)
SODIUM SERPL-SCNC: 144 MMOL/L (ref 136–145)
TRIGL SERPL-MCNC: 238 MG/DL (ref 0–150)
TSH SERPL DL<=0.005 MIU/L-ACNC: 0.66 MIU/ML (ref 0.27–4.2)
VLDLC SERPL CALC-MCNC: 47.6 MG/DL
WBC # BLD AUTO: 4.79 10*3/MM3 (ref 3.4–10.8)

## 2019-07-03 ENCOUNTER — HOSPITAL ENCOUNTER (OUTPATIENT)
Dept: MAMMOGRAPHY | Facility: HOSPITAL | Age: 69
Discharge: HOME | End: 2019-07-03
Admitting: INTERNAL MEDICINE

## 2019-07-03 DIAGNOSIS — Z12.31 VISIT FOR SCREENING MAMMOGRAM: ICD-10-CM

## 2019-07-03 PROCEDURE — 77067 SCR MAMMO BI INCL CAD: CPT

## 2019-07-09 ENCOUNTER — OFFICE VISIT (OUTPATIENT)
Dept: FAMILY MEDICINE CLINIC | Facility: CLINIC | Age: 69
End: 2019-07-09

## 2019-07-09 VITALS
DIASTOLIC BLOOD PRESSURE: 70 MMHG | WEIGHT: 166.8 LBS | SYSTOLIC BLOOD PRESSURE: 120 MMHG | HEART RATE: 45 BPM | BODY MASS INDEX: 28.48 KG/M2 | RESPIRATION RATE: 16 BRPM | HEIGHT: 64 IN | OXYGEN SATURATION: 99 % | TEMPERATURE: 97.7 F

## 2019-07-09 DIAGNOSIS — M85.80 OSTEOPENIA, UNSPECIFIED LOCATION: ICD-10-CM

## 2019-07-09 DIAGNOSIS — N18.2 CHRONIC KIDNEY DISEASE, STAGE II (MILD): ICD-10-CM

## 2019-07-09 DIAGNOSIS — E78.5 HYPERLIPIDEMIA, UNSPECIFIED HYPERLIPIDEMIA TYPE: Primary | ICD-10-CM

## 2019-07-09 DIAGNOSIS — R73.01 IMPAIRED FASTING GLUCOSE: ICD-10-CM

## 2019-07-09 DIAGNOSIS — E03.9 ACQUIRED HYPOTHYROIDISM: ICD-10-CM

## 2019-07-09 PROCEDURE — 99214 OFFICE O/P EST MOD 30 MIN: CPT | Performed by: INTERNAL MEDICINE

## 2019-07-09 NOTE — PROGRESS NOTES
Subjective   Rosalina Ferrell is a 69 y.o. female. Patient is here today for   Chief Complaint   Patient presents with   • Hyperlipidemia     HYPOTHYROIDISM- FOLLOW UP LABS          Vitals:    07/09/19 1008   BP: 120/70   Pulse: (!) 45   Resp: 16   Temp: 97.7 °F (36.5 °C)   SpO2: 99%       The following portions of the patient's history were reviewed and updated as appropriate: allergies, current medications, past family history, past medical history, past social history, past surgical history and problem list.    Past Medical History:   Diagnosis Date   • Hyperlipidemia    • Hypothyroidism    • Urinary incontinence       No Known Allergies   Social History     Socioeconomic History   • Marital status:      Spouse name: Not on file   • Number of children: Not on file   • Years of education: Not on file   • Highest education level: Not on file   Tobacco Use   • Smoking status: Never Smoker   • Smokeless tobacco: Never Used   Substance and Sexual Activity   • Alcohol use: No        Current Outpatient Medications:   •  aspirin 81 MG tablet, Take 81 mg by mouth Daily., Disp: , Rfl:   •  desonide (DESOWEN) 0.05 % cream, Apply  topically to the appropriate area as directed 2 (Two) Times a Day., Disp: , Rfl:   •  divalproex (DEPAKOTE) 250 MG EC tablet, Take by mouth., Disp: , Rfl:   •  levothyroxine (SYNTHROID, LEVOTHROID) 100 MCG tablet, TAKE 1 TABLET EVERY MORNING, Disp: 90 tablet, Rfl: 1  •  lithium (ESKALITH) 450 MG CR tablet, Take by mouth., Disp: , Rfl:   •  mometasone (ELOCON) 0.1 % lotion, Apply  topically to the appropriate area as directed Daily., Disp: , Rfl:   •  propranolol (INDERAL) 40 MG tablet, Take by mouth., Disp: , Rfl:   •  QUEtiapine (SEROquel) 25 MG tablet, , Disp: , Rfl:   •  simvastatin (ZOCOR) 40 MG tablet, TAKE 1 TABLET EVERY NIGHT, Disp: 90 tablet, Rfl: 1     Objective   History of Present Illness Rosalina is here for blood pressure check and lab follow-up.  She has hyperlipidemia, hypothyroidism,  and chronic kidney disease.  She also is bipolar and is followed by psychiatry every few months.  She is on lithium, Depakote, and Seroquel.  She had all of her upper teeth pulled and is trying to adapt to a denture.  She lost a lot of weight in the last 6 months as she has been unable to eat solid food.  She does try to exercise some.  DEXA scan 2017 showed osteopenia.    Review of Systems   Constitutional:        20 LB WEIGHT LOSS   Respiratory: Negative.    Cardiovascular: Negative.    Genitourinary: Negative.    Psychiatric/Behavioral:        As in HPI       Physical Exam   Constitutional: She appears well-developed and well-nourished.   Neck: No thyromegaly present.   Cardiovascular: Normal rate, regular rhythm and normal heart sounds.   Pulmonary/Chest: Effort normal and breath sounds normal.   Neurological: She is alert.   Psychiatric: She has a normal mood and affect.   Vitals reviewed.      ASSESSMENT     Problem List Items Addressed This Visit        Cardiovascular and Mediastinum    Hyperlipidemia - Primary       Endocrine    Hypothyroidism    Impaired fasting glucose       Musculoskeletal and Integument    Osteopenia       Genitourinary    Chronic kidney disease, stage II (mild)          PLAN  Patient Instructions   Blood pressure is normal.  Total cholesterol is normal but triglycerides are moderately elevated.  Fasting blood sugar is slightly increased.  Kidney functions are stable.  Thyroid-stimulating hormone level is stable.  Discussed exercise.  We will continue current medicines.      Return in about 6 months (around 1/9/2020) for labs BMP, TSH, vitamin D.

## 2019-07-09 NOTE — PATIENT INSTRUCTIONS
Blood pressure is normal.  Total cholesterol is normal but triglycerides are moderately elevated.  Fasting blood sugar is slightly increased.  Kidney functions are stable.  Thyroid-stimulating hormone level is stable.  Discussed exercise.  We will continue current medicines.

## 2019-09-23 RX ORDER — LEVOTHYROXINE SODIUM 0.1 MG/1
TABLET ORAL
Qty: 90 TABLET | Refills: 1 | Status: SHIPPED | OUTPATIENT
Start: 2019-09-23 | End: 2020-02-27

## 2019-09-23 RX ORDER — SIMVASTATIN 40 MG
TABLET ORAL
Qty: 90 TABLET | Refills: 1 | Status: SHIPPED | OUTPATIENT
Start: 2019-09-23 | End: 2019-11-30 | Stop reason: SDUPTHER

## 2019-11-13 ENCOUNTER — OFFICE VISIT (OUTPATIENT)
Dept: FAMILY MEDICINE CLINIC | Facility: CLINIC | Age: 69
End: 2019-11-13

## 2019-11-13 ENCOUNTER — HOSPITAL ENCOUNTER (OUTPATIENT)
Dept: ULTRASOUND IMAGING | Facility: HOSPITAL | Age: 69
Discharge: HOME | End: 2019-11-13
Admitting: NURSE PRACTITIONER

## 2019-11-13 ENCOUNTER — LAB (OUTPATIENT)
Dept: LAB | Facility: HOSPITAL | Age: 69
End: 2019-11-13

## 2019-11-13 ENCOUNTER — HOSPITAL ENCOUNTER (OUTPATIENT)
Dept: ULTRASOUND IMAGING | Facility: HOSPITAL | Age: 69
End: 2019-11-13

## 2019-11-13 VITALS
BODY MASS INDEX: 28.13 KG/M2 | WEIGHT: 164.8 LBS | OXYGEN SATURATION: 99 % | DIASTOLIC BLOOD PRESSURE: 70 MMHG | RESPIRATION RATE: 16 BRPM | HEART RATE: 64 BPM | HEIGHT: 64 IN | SYSTOLIC BLOOD PRESSURE: 130 MMHG | TEMPERATURE: 98.1 F

## 2019-11-13 DIAGNOSIS — R10.11 RIGHT UPPER QUADRANT ABDOMINAL PAIN: ICD-10-CM

## 2019-11-13 DIAGNOSIS — R10.11 RIGHT UPPER QUADRANT ABDOMINAL PAIN: Primary | ICD-10-CM

## 2019-11-13 LAB
ALBUMIN SERPL-MCNC: 4.8 G/DL (ref 3.5–5.2)
ALBUMIN/GLOB SERPL: 1.8 G/DL
ALP SERPL-CCNC: 49 U/L (ref 39–117)
ALT SERPL W P-5'-P-CCNC: 22 U/L (ref 1–33)
ANION GAP SERPL CALCULATED.3IONS-SCNC: 11.2 MMOL/L (ref 5–15)
AST SERPL-CCNC: 21 U/L (ref 1–32)
BASOPHILS # BLD AUTO: 0.04 10*3/MM3 (ref 0–0.2)
BASOPHILS NFR BLD AUTO: 0.7 % (ref 0–1.5)
BILIRUB SERPL-MCNC: 0.2 MG/DL (ref 0.2–1.2)
BUN BLD-MCNC: 30 MG/DL (ref 8–23)
BUN/CREAT SERPL: 23.3 (ref 7–25)
CALCIUM SPEC-SCNC: 10.8 MG/DL (ref 8.6–10.5)
CHLORIDE SERPL-SCNC: 108 MMOL/L (ref 98–107)
CO2 SERPL-SCNC: 25.8 MMOL/L (ref 22–29)
CREAT BLD-MCNC: 1.29 MG/DL (ref 0.57–1)
DEPRECATED RDW RBC AUTO: 46 FL (ref 37–54)
EOSINOPHIL # BLD AUTO: 0.12 10*3/MM3 (ref 0–0.4)
EOSINOPHIL NFR BLD AUTO: 2.1 % (ref 0.3–6.2)
ERYTHROCYTE [DISTWIDTH] IN BLOOD BY AUTOMATED COUNT: 12.9 % (ref 12.3–15.4)
GFR SERPL CREATININE-BSD FRML MDRD: 41 ML/MIN/1.73
GLOBULIN UR ELPH-MCNC: 2.6 GM/DL
GLUCOSE BLD-MCNC: 96 MG/DL (ref 65–99)
HCT VFR BLD AUTO: 40 % (ref 34–46.6)
HGB BLD-MCNC: 12.5 G/DL (ref 12–15.9)
IMM GRANULOCYTES # BLD AUTO: 0.01 10*3/MM3 (ref 0–0.05)
IMM GRANULOCYTES NFR BLD AUTO: 0.2 % (ref 0–0.5)
LYMPHOCYTES # BLD AUTO: 2.03 10*3/MM3 (ref 0.7–3.1)
LYMPHOCYTES NFR BLD AUTO: 36.3 % (ref 19.6–45.3)
MCH RBC QN AUTO: 29.9 PG (ref 26.6–33)
MCHC RBC AUTO-ENTMCNC: 31.3 G/DL (ref 31.5–35.7)
MCV RBC AUTO: 95.7 FL (ref 79–97)
MONOCYTES # BLD AUTO: 0.46 10*3/MM3 (ref 0.1–0.9)
MONOCYTES NFR BLD AUTO: 8.2 % (ref 5–12)
NEUTROPHILS # BLD AUTO: 2.94 10*3/MM3 (ref 1.7–7)
NEUTROPHILS NFR BLD AUTO: 52.5 % (ref 42.7–76)
NRBC BLD AUTO-RTO: 0 /100 WBC (ref 0–0.2)
PLATELET # BLD AUTO: 151 10*3/MM3 (ref 140–450)
PMV BLD AUTO: 11.2 FL (ref 6–12)
POTASSIUM BLD-SCNC: 4.7 MMOL/L (ref 3.5–5.2)
PROT SERPL-MCNC: 7.4 G/DL (ref 6–8.5)
RBC # BLD AUTO: 4.18 10*6/MM3 (ref 3.77–5.28)
SODIUM BLD-SCNC: 145 MMOL/L (ref 136–145)
WBC NRBC COR # BLD: 5.6 10*3/MM3 (ref 3.4–10.8)

## 2019-11-13 PROCEDURE — 36415 COLL VENOUS BLD VENIPUNCTURE: CPT

## 2019-11-13 PROCEDURE — 85025 COMPLETE CBC W/AUTO DIFF WBC: CPT | Performed by: NURSE PRACTITIONER

## 2019-11-13 PROCEDURE — 80053 COMPREHEN METABOLIC PANEL: CPT | Performed by: NURSE PRACTITIONER

## 2019-11-13 PROCEDURE — 76705 ECHO EXAM OF ABDOMEN: CPT

## 2019-11-13 PROCEDURE — 99214 OFFICE O/P EST MOD 30 MIN: CPT | Performed by: NURSE PRACTITIONER

## 2019-11-13 NOTE — PROGRESS NOTES
"Fatoumata Ferrell is a 69 y.o.. female.     Pt stating she started having right upper abd. Pain that radiates to her back on Monday night. Pt stating that on Tuesday her abd. hurt through out day, and got worse at night. Pt stating she ate a salad on Sunday and ate nola fat cheese also. Pt denies eating fried/greasy foods. Pt stating she eats a healthy diet mostly. Pt denies fevers, n/v/d.       The following portions of the patient's history were reviewed and updated as appropriate: allergies, current medications, past family history, past medical history, past social history, past surgical history and problem list.    Past Medical History:   Diagnosis Date   • Hyperlipidemia    • Hypothyroidism    • Urinary incontinence        Past Surgical History:   Procedure Laterality Date   • BREAST BIOPSY     • HYSTERECTOMY     • OOPHORECTOMY         Review of Systems   Constitutional: Negative.    HENT: Negative.    Respiratory: Negative.    Cardiovascular: Negative.    Gastrointestinal: Positive for abdominal pain (right upper quad radiating to back) and constipation (has 1 bm a week). Negative for diarrhea, nausea and vomiting.   Genitourinary: Negative.    Musculoskeletal: Negative.    Neurological: Negative.        No Known Allergies    Objective     Vitals:    11/13/19 1156   BP: 130/70   Pulse: 64   Resp: 16   Temp: 98.1 °F (36.7 °C)   TempSrc: Oral   SpO2: 99%   Weight: 74.8 kg (164 lb 12.8 oz)   Height: 162.6 cm (64.02\")     Body mass index is 28.27 kg/m².    Physical Exam   Constitutional: She is oriented to person, place, and time. She appears well-developed and well-nourished.   HENT:   Head: Normocephalic and atraumatic.   Eyes: Pupils are equal, round, and reactive to light.   Cardiovascular: Normal rate and regular rhythm.   Pulmonary/Chest: Effort normal and breath sounds normal.   Abdominal: Soft. Normal appearance. Bowel sounds are decreased. There is no hepatosplenomegaly. There is tenderness in " the right upper quadrant. There is guarding. There is no rigidity and no rebound.   Musculoskeletal: Normal range of motion.   Neurological: She is alert and oriented to person, place, and time.   Vitals reviewed.        Current Outpatient Medications:   •  aspirin 81 MG tablet, Take 81 mg by mouth Daily., Disp: , Rfl:   •  desonide (DESOWEN) 0.05 % cream, Apply  topically to the appropriate area as directed 2 (Two) Times a Day., Disp: , Rfl:   •  divalproex (DEPAKOTE) 250 MG EC tablet, Take by mouth., Disp: , Rfl:   •  levothyroxine (SYNTHROID, LEVOTHROID) 100 MCG tablet, TAKE 1 TABLET EVERY MORNING, Disp: 90 tablet, Rfl: 1  •  lithium (ESKALITH) 450 MG CR tablet, Take by mouth., Disp: , Rfl:   •  mometasone (ELOCON) 0.1 % lotion, Apply  topically to the appropriate area as directed Daily., Disp: , Rfl:   •  propranolol (INDERAL) 40 MG tablet, Take by mouth., Disp: , Rfl:   •  QUEtiapine (SEROquel) 25 MG tablet, , Disp: , Rfl:   •  simvastatin (ZOCOR) 40 MG tablet, TAKE 1 TABLET EVERY NIGHT, Disp: 90 tablet, Rfl: 1    US Abdomen Limited  LIMITED ULTRASOUND OF THE ABDOMEN WITH ATTENTION TO THE RIGHT UPPER  QUADRANT     CLINICAL HISTORY: Right upper quadrant pain.     There is diffuse increased echogenicity throughout the liver parenchyma  consistent with mild to moderate hepatic steatosis. A small  approximately 9 mm diameter simple cyst is also noted in the anterior  aspect of the right lobe of the liver. The liver is otherwise  unremarkable. The gallbladder surgically absent. The common bile duct  measures 6 mm which is within normal limits. The right kidney is normal  in size and shape and shows no hydronephrosis. The head and body the  pancreas were fairly well seen, and appear within normal limits.     IMPRESSIONS: Hepatic steatosis. Small hepatic cyst. Otherwise  unremarkable ultrasound of the right upper quadrant.     This report was finalized on 11/13/2019 1:41 PM by Dr. Romaine Orr M.D.     Called pt's  family and informed of above U/S results; still waiting for lab results. Will call pt's family as soon as lab results come through. Pt's family informed we will monitor the small hepatic cyst and hepatic steatosis. Pt's family verb. Understanding.     Assessment/Plan   Rosalina was seen today for abdominal pain.    Diagnoses and all orders for this visit:    Right upper quadrant abdominal pain  -     CBC w AUTO Differential; Future  -     Comprehensive metabolic panel; Future  -     US Abdomen Complete; Future  -     Cancel: US Abdomen Complete  -     US Abdomen Limited        Patient Instructions   Recommend pt to add Probiotic with fiber for abd. Discomfort and Colace daily for stool softner    Awaiting all testing for further treatment plan  Pt to drink plenty of water, not eat fried/greasy/high fat foods.    Pt to call/rto if having any further issues/concerns.       Return if symptoms worsen or fail to improve, for follow up as needed .

## 2019-11-13 NOTE — PATIENT INSTRUCTIONS
Recommend pt to add Probiotic with fiber for abd. Discomfort and Colace daily for stool softner    Awaiting all testing for further treatment plan  Pt to drink plenty of water, not eat fried/greasy/high fat foods.    Pt to call/rto if having any further issues/concerns.

## 2019-11-14 ENCOUNTER — TELEPHONE (OUTPATIENT)
Dept: FAMILY MEDICINE CLINIC | Facility: CLINIC | Age: 69
End: 2019-11-14

## 2019-11-14 NOTE — TELEPHONE ENCOUNTER
Called and informed pt's family of lab results; liver enzymes were wnl, cbc wnl. Pt's family verb. Understanding. Pt's family informed that if having any issues to call/rto.

## 2019-12-02 RX ORDER — SIMVASTATIN 40 MG
TABLET ORAL
Qty: 90 TABLET | Refills: 0 | Status: SHIPPED | OUTPATIENT
Start: 2019-12-02 | End: 2020-03-24

## 2019-12-19 DIAGNOSIS — E55.9 VITAMIN D DEFICIENCY: ICD-10-CM

## 2019-12-19 DIAGNOSIS — E03.9 ACQUIRED HYPOTHYROIDISM: ICD-10-CM

## 2019-12-19 DIAGNOSIS — E78.5 HYPERLIPIDEMIA, UNSPECIFIED HYPERLIPIDEMIA TYPE: Primary | ICD-10-CM

## 2020-01-07 LAB
25(OH)D3+25(OH)D2 SERPL-MCNC: 41.7 NG/ML (ref 30–100)
BUN SERPL-MCNC: 24 MG/DL (ref 8–23)
BUN/CREAT SERPL: 19.4 (ref 7–25)
CALCIUM SERPL-MCNC: 10.5 MG/DL (ref 8.6–10.5)
CHLORIDE SERPL-SCNC: 105 MMOL/L (ref 98–107)
CO2 SERPL-SCNC: 28.8 MMOL/L (ref 22–29)
CREAT SERPL-MCNC: 1.24 MG/DL (ref 0.57–1)
GLUCOSE SERPL-MCNC: 103 MG/DL (ref 65–99)
POTASSIUM SERPL-SCNC: 5 MMOL/L (ref 3.5–5.2)
SODIUM SERPL-SCNC: 144 MMOL/L (ref 136–145)
TSH SERPL DL<=0.005 MIU/L-ACNC: 1.76 UIU/ML (ref 0.27–4.2)

## 2020-02-27 ENCOUNTER — OFFICE VISIT (OUTPATIENT)
Dept: FAMILY MEDICINE CLINIC | Facility: CLINIC | Age: 70
End: 2020-02-27

## 2020-02-27 VITALS
RESPIRATION RATE: 17 BRPM | DIASTOLIC BLOOD PRESSURE: 70 MMHG | OXYGEN SATURATION: 98 % | HEIGHT: 64 IN | HEART RATE: 57 BPM | WEIGHT: 168 LBS | SYSTOLIC BLOOD PRESSURE: 118 MMHG | BODY MASS INDEX: 28.68 KG/M2 | TEMPERATURE: 97.1 F

## 2020-02-27 DIAGNOSIS — M85.80 OSTEOPENIA, UNSPECIFIED LOCATION: ICD-10-CM

## 2020-02-27 DIAGNOSIS — R73.01 IMPAIRED FASTING GLUCOSE: ICD-10-CM

## 2020-02-27 DIAGNOSIS — E03.9 ACQUIRED HYPOTHYROIDISM: ICD-10-CM

## 2020-02-27 DIAGNOSIS — E78.5 HYPERLIPIDEMIA, UNSPECIFIED HYPERLIPIDEMIA TYPE: Primary | ICD-10-CM

## 2020-02-27 DIAGNOSIS — N18.2 CHRONIC KIDNEY DISEASE, STAGE II (MILD): ICD-10-CM

## 2020-02-27 DIAGNOSIS — Z78.0 POST-MENOPAUSE: ICD-10-CM

## 2020-02-27 DIAGNOSIS — E55.9 VITAMIN D DEFICIENCY: ICD-10-CM

## 2020-02-27 PROCEDURE — 99214 OFFICE O/P EST MOD 30 MIN: CPT | Performed by: INTERNAL MEDICINE

## 2020-02-27 RX ORDER — LEVOTHYROXINE SODIUM 0.07 MG/1
75 TABLET ORAL DAILY
COMMUNITY
Start: 2017-12-12 | End: 2021-08-06 | Stop reason: SDUPTHER

## 2020-02-27 NOTE — PATIENT INSTRUCTIONS
Blood pressure is normal.  Thyroid-stimulating hormone level is normal.  Vitamin D is normal.  Fasting blood sugar is minimally elevated.  Renal functions are stable.  We will also check a microalbumin creatinine ratio.  Will continue diet, exercise, and current medicines.  Will check a bone density test.

## 2020-02-27 NOTE — PROGRESS NOTES
Subjective   Rosalina Ferrell is a 70 y.o. female. Patient is here today for   Chief Complaint   Patient presents with   • Hyperlipidemia   • Hypothyroidism          Vitals:    02/27/20 1445   BP: 118/70   Pulse: 57   Resp: 17   Temp: 97.1 °F (36.2 °C)   SpO2: 98%     Body mass index is 28.84 kg/m².      The following portions of the patient's history were reviewed and updated as appropriate: allergies, current medications, past family history, past medical history, past social history, past surgical history and problem list.    Past Medical History:   Diagnosis Date   • Hyperlipidemia    • Hypothyroidism    • Urinary incontinence       No Known Allergies   Social History     Socioeconomic History   • Marital status:      Spouse name: Not on file   • Number of children: Not on file   • Years of education: Not on file   • Highest education level: Not on file   Tobacco Use   • Smoking status: Never Smoker   • Smokeless tobacco: Never Used   Substance and Sexual Activity   • Alcohol use: No   • Drug use: Defer   • Sexual activity: Defer        Current Outpatient Medications:   •  levothyroxine (SYNTHROID, LEVOTHROID) 75 MCG tablet, Take 75 mcg by mouth Daily., Disp: , Rfl:   •  aspirin 81 MG tablet, Take 81 mg by mouth Daily., Disp: , Rfl:   •  desonide (DESOWEN) 0.05 % cream, Apply  topically to the appropriate area as directed 2 (Two) Times a Day., Disp: , Rfl:   •  divalproex (DEPAKOTE) 250 MG EC tablet, Take by mouth., Disp: , Rfl:   •  lithium (ESKALITH) 450 MG CR tablet, Take by mouth., Disp: , Rfl:   •  mometasone (ELOCON) 0.1 % lotion, Apply  topically to the appropriate area as directed Daily., Disp: , Rfl:   •  propranolol (INDERAL) 40 MG tablet, Take by mouth., Disp: , Rfl:   •  QUEtiapine (SEROquel) 25 MG tablet, , Disp: , Rfl:   •  simvastatin (ZOCOR) 40 MG tablet, TAKE 1 TABLET EVERY NIGHT, Disp: 90 tablet, Rfl: 0     Objective   History of Present Illness Rosalina is here for lab follow-up.  She has  hypothyroidism, chronic kidney disease, elevated fasting glucose, vitamin D deficiency, osteopenia, and hyperlipidemia.  She also has tremor and parkinsonism and is followed by neurology.  She also is bipolar and is followed by psychiatry.  She is on lithium and Depakote and has been stable.  She feels well.  She eats healthy and exercises a couple days a week.  Her weight is unchanged in the last year.  She does take a vitamin D supplement.  Last bone density test was November 2017.    Review of Systems   Constitutional: Negative for activity change and unexpected weight change.   Respiratory: Negative.    Cardiovascular: Negative.    Genitourinary: Negative.    Neurological: Negative.    Psychiatric/Behavioral:        As in HPI       Physical Exam   Constitutional: She appears well-developed and well-nourished.   Neck: No thyromegaly present.   Cardiovascular: Normal rate, regular rhythm and normal heart sounds.   Pulmonary/Chest: Effort normal and breath sounds normal.   Psychiatric: She has a normal mood and affect. Her behavior is normal. Judgment and thought content normal.   Vitals reviewed.      ASSESSMENT     Problem List Items Addressed This Visit        Cardiovascular and Mediastinum    Hyperlipidemia - Primary       Digestive    Vitamin D deficiency       Endocrine    Hypothyroidism    Relevant Medications    levothyroxine (SYNTHROID, LEVOTHROID) 75 MCG tablet    Impaired fasting glucose       Musculoskeletal and Integument    Osteopenia       Genitourinary    Chronic kidney disease, stage II (mild)    Relevant Orders    Microalbumin / Creatinine Urine Ratio - Urine, Clean Catch      Other Visit Diagnoses     Post-menopause        Relevant Orders    DEXA Bone Density Axial          PLAN  Patient Instructions   Blood pressure is normal.  Thyroid-stimulating hormone level is normal.  Vitamin D is normal.  Fasting blood sugar is minimally elevated.  Renal functions are stable.  We will also check a  microalbumin creatinine ratio.  Will continue diet, exercise, and current medicines.  Will check a bone density test.      Return in about 6 months (around 8/27/2020) for labs.

## 2020-02-29 LAB
ALBUMIN/CREAT UR: 13 MG/G CREAT (ref 0–29)
CREAT UR-MCNC: 45.2 MG/DL
MICROALBUMIN UR-MCNC: 5.9 UG/ML

## 2020-03-19 ENCOUNTER — HOSPITAL ENCOUNTER (OUTPATIENT)
Dept: BONE DENSITY | Facility: HOSPITAL | Age: 70
Discharge: HOME | End: 2020-03-19
Admitting: INTERNAL MEDICINE

## 2020-03-19 PROCEDURE — 77080 DXA BONE DENSITY AXIAL: CPT

## 2020-03-24 RX ORDER — SIMVASTATIN 40 MG
TABLET ORAL
Qty: 90 TABLET | Refills: 0 | Status: SHIPPED | OUTPATIENT
Start: 2020-03-24 | End: 2020-07-01

## 2020-05-19 ENCOUNTER — OFFICE VISIT (OUTPATIENT)
Dept: FAMILY MEDICINE CLINIC | Facility: CLINIC | Age: 70
End: 2020-05-19

## 2020-05-19 VITALS
HEART RATE: 56 BPM | SYSTOLIC BLOOD PRESSURE: 116 MMHG | RESPIRATION RATE: 18 BRPM | TEMPERATURE: 97.5 F | DIASTOLIC BLOOD PRESSURE: 74 MMHG | OXYGEN SATURATION: 98 % | HEIGHT: 64 IN | BODY MASS INDEX: 28.89 KG/M2 | WEIGHT: 169.2 LBS

## 2020-05-19 DIAGNOSIS — Z00.00 MEDICARE ANNUAL WELLNESS VISIT, SUBSEQUENT: Primary | ICD-10-CM

## 2020-05-19 PROBLEM — G31.83 LEWY BODY DEMENTIA: Status: ACTIVE | Noted: 2018-08-01

## 2020-05-19 PROBLEM — F02.80 LEWY BODY DEMENTIA: Status: ACTIVE | Noted: 2018-08-01

## 2020-05-19 PROCEDURE — 99214 OFFICE O/P EST MOD 30 MIN: CPT | Performed by: NURSE PRACTITIONER

## 2020-05-19 RX ORDER — QUETIAPINE FUMARATE 100 MG/1
TABLET, FILM COATED ORAL
COMMUNITY
Start: 2020-03-23

## 2020-05-19 RX ORDER — CYCLOSPORINE 0.5 MG/ML
EMULSION OPHTHALMIC
COMMUNITY
Start: 2020-05-12 | End: 2021-08-06

## 2020-05-19 RX ORDER — CHLORAL HYDRATE 500 MG
CAPSULE ORAL
COMMUNITY

## 2020-05-19 NOTE — PROGRESS NOTES
The ABCs of the Annual Wellness Visit  Subsequent Medicare Wellness Visit    Chief Complaint   Patient presents with   • Medicare Wellness-subsequent       History of Present Illness:  Rosalina Ferrell is a 70 y.o. female who presents for a Subsequent Medicare Wellness Visit.  Subjective   HEALTH RISK ASSESSMENT    Recent Hospitalizations:  No hospitalization(s) within the last year.    Current Medical Providers:  Patient Care Team:  Kavon Wheat MD as PCP - General (Internal Medicine)    Smoking Status:  Social History     Tobacco Use   Smoking Status Never Smoker   Smokeless Tobacco Never Used       Alcohol Consumption:  Social History     Substance and Sexual Activity   Alcohol Use No       Depression Screen:   PHQ-2/PHQ-9 Depression Screening 2/27/2020   Little interest or pleasure in doing things 0   Feeling down, depressed, or hopeless 0   Trouble falling or staying asleep, or sleeping too much -   Feeling tired or having little energy -   Poor appetite or overeating -   Feeling bad about yourself - or that you are a failure or have let yourself or your family down -   Trouble concentrating on things, such as reading the newspaper or watching television -   Moving or speaking so slowly that other people could have noticed. Or the opposite - being so fidgety or restless that you have been moving around a lot more than usual -   Thoughts that you would be better off dead, or of hurting yourself in some way -   Total Score 0       Fall Risk Screen:  BRIANADI Fall Risk Assessment was completed, and patient is at MODERATE risk for falls. Assessment completed on:2/27/2020     -- The Timed Up and Go (TUG) Test (CDC Version)                  - Testing directions adhered to:                                  1) Patients wears regular footwear and may use walking aid(s) if    needed.                                  2) Patient to sit back in standard arm chair and identify a line 10 feet    away from patient on floor.      "                             3) Test time begins at \"Go\" and stops when patient reseated in arm    chair.                    - Instructions read aloud (and demonstrated as applicable) to patient:                                      When I say \"Go,\" I want you to:                                    1) Stand up from the chair.                                  2) Walk to the line on the floor (10 feet away) at your normal pace.                                  3) Turn.                                  4) Walk back to the chair at your normal pace.                                  5) Sit down again.                    - Result: 8 seconds                    - Observations during test:Normal gait and Short stride length      Health Habits and Functional and Cognitive Screening:  Functional & Cognitive Status 2/8/2019   Do you have difficulty preparing food and eating? No   Do you have difficulty bathing yourself, getting dressed or grooming yourself? No   Do you have difficulty using the toilet? No   Do you have difficulty moving around from place to place? No   Do you have trouble with steps or getting out of a bed or a chair? No   Current Diet Well Balanced Diet   Dental Exam Up to date   Eye Exam Up to date   Exercise (times per week) 1 times per week   Current Exercise Activities Include (No Data)   Do you need help using the phone?  No   Are you deaf or do you have serious difficulty hearing?  No   Do you need help with transportation? No   Do you need help shopping? No   Do you need help preparing meals?  No   Do you need help with housework?  No   Do you need help with laundry? No   Do you need help taking your medications? No   Do you need help managing money? No   Do you ever drive or ride in a car without wearing a seat belt? No   Have you felt unusual stress, anger or loneliness in the last month? Yes   Who do you live with? Spouse   If you need help, do you have trouble finding someone available to you? No "   Have you been bothered in the last four weeks by sexual problems? No   Do you have difficulty concentrating, remembering or making decisions? No       Does the patient have evidence of cognitive impairment? No; has lewy body dementia/MCI    Asprin use counseling:Taking ASA appropriately as indicated    Age-appropriate Screening Schedule:  Refer to the list below for future screening recommendations based on patient's age, sex and/or medical conditions. Orders for these recommended tests are listed in the plan section. The patient has been provided with a written plan.    Health Maintenance   Topic Date Due   • TDAP/TD VACCINES (1 - Tdap) 01/04/1961   • ZOSTER VACCINE (1 of 2) 01/04/1998   • LIPID PANEL  06/20/2020   • INFLUENZA VACCINE  08/01/2020   • MAMMOGRAM  07/03/2021   • DXA SCAN  03/19/2022   • COLONOSCOPY  03/18/2024   • PNEUMOCOCCAL VACCINE (65+ HIGH RISK)  Completed          The following portions of the patient's history were reviewed and updated as appropriate: allergies, current medications, past family history, past medical history, past social history, past surgical history and problem list.    Outpatient Medications Prior to Visit   Medication Sig Dispense Refill   • aspirin 81 MG tablet Take 81 mg by mouth Daily.     • divalproex (DEPAKOTE) 250 MG EC tablet Take by mouth.     • levothyroxine (SYNTHROID, LEVOTHROID) 75 MCG tablet Take 75 mcg by mouth Daily.     • lithium (ESKALITH) 450 MG CR tablet Take by mouth.     • Omega-3 Fatty Acids (FISH OIL) 1000 MG capsule capsule Take  by mouth Daily With Breakfast.     • propranolol (INDERAL) 40 MG tablet Take by mouth.     • QUEtiapine (SEROquel) 100 MG tablet      • RESTASIS 0.05 % ophthalmic emulsion      • simvastatin (ZOCOR) 40 MG tablet TAKE 1 TABLET EVERY NIGHT 90 tablet 0   • desonide (DESOWEN) 0.05 % cream Apply  topically to the appropriate area as directed 2 (Two) Times a Day.     • mometasone (ELOCON) 0.1 % lotion Apply  topically to the  "appropriate area as directed Daily.     • QUEtiapine (SEROquel) 25 MG tablet        No facility-administered medications prior to visit.        Patient Active Problem List   Diagnosis   • Vitamin D deficiency   • Urinary incontinence   • Compression fracture of vertebral column (CMS/HCC)   • Hypothyroidism   • Osteopenia   • Hypernatremia   • Hyperlipidemia   • Hypertonicity of bladder   • Impaired fasting glucose   • Elevated aspartate aminotransferase level   • Elevated alanine aminotransferase (ALT) level   • Chronic kidney disease, stage II (mild)   • Bipolar I disorder, single manic episode (CMS/HCC)   • Generalized abdominal pain   • Gastroesophageal reflux disease without esophagitis   • Tremor   • Elevated fasting glucose   • Constipation   • MCI (mild cognitive impairment)   • Medication-induced postural tremor   • Parkinsonism (CMS/HCC)   • Hallucinations   • Lewy body dementia (CMS/HCC)       Advanced Care Planning:  ACP discussion was held with the patient during this visit. Patient has an advance directive in EMR which is still valid.     Review of Systems   Constitutional: Negative.    Respiratory: Negative.    Cardiovascular: Negative.        Compared to one year ago, the patient feels her physical health is worse.; dental issues   Compared to one year ago, the patient feels her mental health is the same.    Reviewed chart for potential of high risk medication in the elderly: yes  Reviewed chart for potential of harmful drug interactions in the elderly:yes    Objective         Vitals:    05/19/20 1251   BP: 116/74   Pulse: 56   Resp: 18   Temp: 97.5 °F (36.4 °C)   SpO2: 98%   Weight: 76.7 kg (169 lb 3.2 oz)   Height: 162.6 cm (64.02\")       Body mass index is 29.03 kg/m².  Discussed the patient's BMI with her. The BMI is above average; BMI management plan is completed.    Physical Exam   Constitutional: She is oriented to person, place, and time. She appears well-developed.   HENT:   Head: " Normocephalic.   Eyes: Pupils are equal, round, and reactive to light.   Cardiovascular: Normal rate and regular rhythm.   No murmur heard.  Pulmonary/Chest: Effort normal and breath sounds normal. No stridor. No respiratory distress. She has no wheezes. She has no rales.   Musculoskeletal: Normal range of motion.   Neurological: She is alert and oriented to person, place, and time.   Vitals reviewed.            Assessment/Plan   Medicare Risks and Personalized Health Plan  CMS Preventative Services Quick Reference  Breast Cancer/Mammogram Screening  Fall Risk  Immunizations Discussed/Encouraged (specific immunizations; adacel Tdap and Shingrix )  Inactivity/Sedentary    The above risks/problems have been discussed with the patient.  Pertinent information has been shared with the patient in the After Visit Summary.  Follow up plans and orders are seen below in the Assessment/Plan Section.    Diagnoses and all orders for this visit:    1. Medicare annual wellness visit, subsequent (Primary)    pt stating that [facility] calls her when mammogram due in summer and schedules it with her then, decline scheduling at this time.  Pt encouraged to increase her walking to 3-5 times a week.    Follow Up:  Return for follow up with Dr. Wheat as needed.     An After Visit Summary and PPPS were given to the patient.

## 2020-05-29 ENCOUNTER — TRANSCRIBE ORDERS (OUTPATIENT)
Dept: ADMINISTRATIVE | Facility: HOSPITAL | Age: 70
End: 2020-05-29

## 2020-05-29 DIAGNOSIS — Z13.9 VISIT FOR SCREENING: Primary | ICD-10-CM

## 2020-06-04 ENCOUNTER — TRANSCRIBE ORDERS (OUTPATIENT)
Dept: ADMINISTRATIVE | Facility: HOSPITAL | Age: 70
End: 2020-06-04

## 2020-06-04 DIAGNOSIS — Z12.31 ENCOUNTER FOR SCREENING MAMMOGRAM FOR BREAST CANCER: Primary | ICD-10-CM

## 2020-06-15 DIAGNOSIS — E03.9 ACQUIRED HYPOTHYROIDISM: ICD-10-CM

## 2020-06-15 DIAGNOSIS — E78.5 HYPERLIPIDEMIA, UNSPECIFIED HYPERLIPIDEMIA TYPE: Primary | ICD-10-CM

## 2020-06-15 DIAGNOSIS — E55.9 VITAMIN D DEFICIENCY: ICD-10-CM

## 2020-06-29 ENCOUNTER — HOSPITAL ENCOUNTER (OUTPATIENT)
Dept: CARDIOLOGY | Facility: HOSPITAL | Age: 70
Discharge: HOME | End: 2020-06-29
Admitting: INTERNAL MEDICINE

## 2020-06-29 VITALS
HEIGHT: 63 IN | HEART RATE: 52 BPM | WEIGHT: 163 LBS | DIASTOLIC BLOOD PRESSURE: 62 MMHG | SYSTOLIC BLOOD PRESSURE: 130 MMHG | BODY MASS INDEX: 28.88 KG/M2

## 2020-06-29 DIAGNOSIS — Z13.9 VISIT FOR SCREENING: ICD-10-CM

## 2020-06-29 LAB
BH CV ECHO MEAS - DIST AO DIAM: 1.35 CM
BH CV VAS BP LEFT ARM: NORMAL MMHG
BH CV VAS BP RIGHT ARM: NORMAL MMHG
BH CV XLRA MEAS - MID AO DIAM: 1.71 CM
BH CV XLRA MEAS - PAD LEFT ABI DP: 1.15
BH CV XLRA MEAS - PAD LEFT ABI PT: 1.12
BH CV XLRA MEAS - PAD LEFT ARM: 130 MMHG
BH CV XLRA MEAS - PAD LEFT LEG DP: 150 MMHG
BH CV XLRA MEAS - PAD LEFT LEG PT: 146 MMHG
BH CV XLRA MEAS - PAD RIGHT ABI DP: 1.17
BH CV XLRA MEAS - PAD RIGHT ABI PT: 1.15
BH CV XLRA MEAS - PAD RIGHT ARM: 130 MMHG
BH CV XLRA MEAS - PAD RIGHT LEG DP: 152 MMHG
BH CV XLRA MEAS - PAD RIGHT LEG PT: 150 MMHG
BH CV XLRA MEAS - PROX AO DIAM: 1.77 CM
BH CV XLRA MEAS LEFT ICA/CCA RATIO: 1.41
BH CV XLRA MEAS LEFT MID CCA PSV: NORMAL CM/SEC
BH CV XLRA MEAS LEFT MID ICA PSV: NORMAL CM/SEC
BH CV XLRA MEAS LEFT PROX ECA PSV: NORMAL CM/SEC
BH CV XLRA MEAS RIGHT ICA/CCA RATIO: 0.92
BH CV XLRA MEAS RIGHT MID CCA PSV: NORMAL CM/SEC
BH CV XLRA MEAS RIGHT MID ICA PSV: NORMAL CM/SEC
BH CV XLRA MEAS RIGHT PROX ECA PSV: NORMAL CM/SEC

## 2020-06-29 PROCEDURE — 93799 UNLISTED CV SVC/PROCEDURE: CPT

## 2020-07-01 RX ORDER — SIMVASTATIN 40 MG
TABLET ORAL
Qty: 90 TABLET | Refills: 0 | Status: SHIPPED | OUTPATIENT
Start: 2020-07-01 | End: 2020-10-26

## 2020-07-07 ENCOUNTER — HOSPITAL ENCOUNTER (OUTPATIENT)
Dept: MAMMOGRAPHY | Facility: HOSPITAL | Age: 70
Discharge: HOME | End: 2020-07-07
Admitting: INTERNAL MEDICINE

## 2020-07-07 DIAGNOSIS — Z12.31 ENCOUNTER FOR SCREENING MAMMOGRAM FOR BREAST CANCER: ICD-10-CM

## 2020-07-07 LAB
25(OH)D3+25(OH)D2 SERPL-MCNC: 37.9 NG/ML (ref 30–100)
BUN SERPL-MCNC: 21 MG/DL (ref 8–23)
BUN/CREAT SERPL: 15.8 (ref 7–25)
CALCIUM SERPL-MCNC: 9.9 MG/DL (ref 8.6–10.5)
CHLORIDE SERPL-SCNC: 109 MMOL/L (ref 98–107)
CO2 SERPL-SCNC: 27.6 MMOL/L (ref 22–29)
CREAT SERPL-MCNC: 1.33 MG/DL (ref 0.57–1)
GLUCOSE SERPL-MCNC: 92 MG/DL (ref 65–99)
POTASSIUM SERPL-SCNC: 4.7 MMOL/L (ref 3.5–5.2)
SODIUM SERPL-SCNC: 144 MMOL/L (ref 136–145)
TSH SERPL DL<=0.005 MIU/L-ACNC: 2.89 UIU/ML (ref 0.27–4.2)

## 2020-07-07 PROCEDURE — 77067 SCR MAMMO BI INCL CAD: CPT

## 2020-07-07 PROCEDURE — 77063 BREAST TOMOSYNTHESIS BI: CPT

## 2020-07-14 ENCOUNTER — OFFICE VISIT (OUTPATIENT)
Dept: FAMILY MEDICINE CLINIC | Facility: CLINIC | Age: 70
End: 2020-07-14

## 2020-07-14 VITALS
HEART RATE: 72 BPM | WEIGHT: 158 LBS | TEMPERATURE: 98.2 F | BODY MASS INDEX: 28 KG/M2 | DIASTOLIC BLOOD PRESSURE: 70 MMHG | HEIGHT: 63 IN | SYSTOLIC BLOOD PRESSURE: 130 MMHG | RESPIRATION RATE: 17 BRPM | OXYGEN SATURATION: 98 %

## 2020-07-14 DIAGNOSIS — M85.80 OSTEOPENIA, UNSPECIFIED LOCATION: ICD-10-CM

## 2020-07-14 DIAGNOSIS — R92.8 ABNORMAL MAMMOGRAM OF LEFT BREAST: ICD-10-CM

## 2020-07-14 DIAGNOSIS — E78.5 HYPERLIPIDEMIA, UNSPECIFIED HYPERLIPIDEMIA TYPE: Primary | ICD-10-CM

## 2020-07-14 DIAGNOSIS — R03.0 ELEVATED BLOOD PRESSURE READING WITHOUT DIAGNOSIS OF HYPERTENSION: ICD-10-CM

## 2020-07-14 DIAGNOSIS — E55.9 VITAMIN D DEFICIENCY: ICD-10-CM

## 2020-07-14 PROCEDURE — 99214 OFFICE O/P EST MOD 30 MIN: CPT | Performed by: INTERNAL MEDICINE

## 2020-07-14 NOTE — PROGRESS NOTES
Subjective   Rosalina Ferrell is a 70 y.o. female. Patient is here today for   Chief Complaint   Patient presents with   • Hyperlipidemia          Vitals:    07/14/20 1000   BP: 130/70   Pulse: 72   Resp: 17   Temp: 98.2 °F (36.8 °C)   SpO2: 98%     Body mass index is 27.99 kg/m².      The following portions of the patient's history were reviewed and updated as appropriate: allergies, current medications, past family history, past medical history, past social history, past surgical history and problem list.    Past Medical History:   Diagnosis Date   • Hyperlipidemia    • Hypothyroidism    • Urinary incontinence       No Known Allergies   Social History     Socioeconomic History   • Marital status:      Spouse name: Not on file   • Number of children: Not on file   • Years of education: Not on file   • Highest education level: Not on file   Tobacco Use   • Smoking status: Never Smoker   • Smokeless tobacco: Never Used   Substance and Sexual Activity   • Alcohol use: No   • Drug use: Defer   • Sexual activity: Defer        Current Outpatient Medications:   •  aspirin 81 MG tablet, Take 81 mg by mouth Daily., Disp: , Rfl:   •  divalproex (DEPAKOTE) 250 MG EC tablet, Take by mouth., Disp: , Rfl:   •  levothyroxine (SYNTHROID, LEVOTHROID) 75 MCG tablet, Take 75 mcg by mouth Daily., Disp: , Rfl:   •  lithium (ESKALITH) 450 MG CR tablet, Take by mouth., Disp: , Rfl:   •  Omega-3 Fatty Acids (FISH OIL) 1000 MG capsule capsule, Take  by mouth Daily With Breakfast., Disp: , Rfl:   •  propranolol (INDERAL) 40 MG tablet, Take by mouth., Disp: , Rfl:   •  QUEtiapine (SEROquel) 100 MG tablet, , Disp: , Rfl:   •  RESTASIS 0.05 % ophthalmic emulsion, , Disp: , Rfl:   •  simvastatin (ZOCOR) 40 MG tablet, TAKE 1 TABLET EVERY NIGHT, Disp: 90 tablet, Rfl: 0     Objective   History of Present Illness Rosalina is here for lab follow-up.  She has hypothyroidism, hyperlipidemia, osteopenia, and vitamin D deficiency.  She feels well.  She  eats healthy and recently started an exercise program.  She has lost some weight.  She also has bipolar illness and is followed by psychiatry.  She is on Depakote and lithium.  She recently had a mammogram which showed some microcalcifications of the left breast and additional views are recommended.  Most recent DEXA scan in February showed mild osteopenia.  She does take one calcium plus D supplement.  Vascular screening last month did show some carotid artery plaque without stenosis.    Review of Systems   Constitutional: Positive for activity change.        5 lb weight loss   Respiratory: Negative for cough and shortness of breath.    Cardiovascular: Negative.    Genitourinary: Negative.    Neurological: Negative.    Psychiatric/Behavioral:        As in HPI       Physical Exam   Constitutional: She appears well-developed and well-nourished.   Cardiovascular: Normal rate, regular rhythm and normal heart sounds.   140/70   Pulmonary/Chest: Effort normal and breath sounds normal.   Musculoskeletal: She exhibits no edema.   Neurological: She is alert.   Psychiatric: She has a normal mood and affect. Her behavior is normal. Judgment and thought content normal.   Vitals reviewed.      ASSESSMENT     Problem List Items Addressed This Visit        Cardiovascular and Mediastinum    Hyperlipidemia - Primary       Digestive    Vitamin D deficiency       Musculoskeletal and Integument    Osteopenia       Other    Elevated blood pressure reading without diagnosis of hypertension      Other Visit Diagnoses     Abnormal mammogram of left breast        Relevant Orders    Mammo Diagnostic Bilateral With CAD          PLAN  Patient Instructions   Blood pressure is high today.  You need to monitor your blood pressure correctly at home as instructed while resting relaxed.  Normal is less than 120/80.  If blood pressure readings continue to be elevated make a follow-up appointment to discuss medical treatment.  Also discussed  importance of low-sodium diet and regular exercise program.  Vitamin D is low normal.  You need to take a D3 supplement in addition to your calcium plus D.  Serum creatinine is mildly elevated and microalbumin creatinine ratio is normal.  Thyroid-stimulating hormone level is normal.  We will continue current levothyroxine dose.  Will order additional left breast films.      Return in about 6 months (around 1/14/2021) for labs CBC, CMP, lipid, TSH, vitamin D.  Answers for HPI/ROS submitted by the patient on 7/12/2020   What is the primary reason for your visit?: Other  Please describe your symptoms.: Blood work followup  Have you had these symptoms before?: No  How long have you been having these symptoms?: Greater than 2 weeks  Please list any medications you are currently taking for this condition.: none

## 2020-07-14 NOTE — PATIENT INSTRUCTIONS
Blood pressure is high today.  You need to monitor your blood pressure correctly at home as instructed while resting relaxed.  Normal is less than 120/80.  If blood pressure readings continue to be elevated make a follow-up appointment to discuss medical treatment.  Also discussed importance of low-sodium diet and regular exercise program.  Vitamin D is low normal.  You need to take a D3 supplement in addition to your calcium plus D.  Serum creatinine is mildly elevated and microalbumin creatinine ratio is normal.  Thyroid-stimulating hormone level is normal.  We will continue current levothyroxine dose.  Will order additional left breast films.

## 2020-07-22 ENCOUNTER — TELEPHONE (OUTPATIENT)
Dept: FAMILY MEDICINE CLINIC | Facility: CLINIC | Age: 70
End: 2020-07-22

## 2020-07-22 NOTE — TELEPHONE ENCOUNTER
MIRACLE FROM [facility] CALLED TO REQUEST DR. ROBERSON PUT IN NEW ORDERS FOR THE NEEDED IMAGING. HE ORDERED A BILATERAL MAMMOGRAM, BUT SHE STATES PT ONLY NEEDS A LEFT DIAGNOSTIC MAMMOGRAM AND A LEFT BREAST ULTRASOUND. SHE STATES HE CAN JUST PUT THE NEW ORDERS IN [facility].    PLEASE FOLLOW UP.    CALLBACK NUMBER: [phone]

## 2020-07-27 ENCOUNTER — HOSPITAL ENCOUNTER (OUTPATIENT)
Dept: ULTRASOUND IMAGING | Facility: HOSPITAL | Age: 70
Discharge: HOME | End: 2020-07-27

## 2020-07-27 ENCOUNTER — HOSPITAL ENCOUNTER (OUTPATIENT)
Dept: MAMMOGRAPHY | Facility: HOSPITAL | Age: 70
Discharge: HOME | End: 2020-07-27
Admitting: INTERNAL MEDICINE

## 2020-07-27 DIAGNOSIS — R92.8 ABNORMAL MAMMOGRAM OF LEFT BREAST: ICD-10-CM

## 2020-07-27 PROCEDURE — 77065 DX MAMMO INCL CAD UNI: CPT

## 2020-07-27 PROCEDURE — 76642 ULTRASOUND BREAST LIMITED: CPT

## 2020-10-26 RX ORDER — SIMVASTATIN 40 MG
TABLET ORAL
Qty: 90 TABLET | Refills: 3 | Status: SHIPPED | OUTPATIENT
Start: 2020-10-26 | End: 2021-07-13

## 2020-11-10 ENCOUNTER — OFFICE VISIT (OUTPATIENT)
Dept: FAMILY MEDICINE CLINIC | Facility: CLINIC | Age: 70
End: 2020-11-10

## 2020-11-10 DIAGNOSIS — E03.9 ACQUIRED HYPOTHYROIDISM: Primary | ICD-10-CM

## 2020-11-10 DIAGNOSIS — L65.9 HAIR LOSS: ICD-10-CM

## 2020-11-10 DIAGNOSIS — E55.9 VITAMIN D DEFICIENCY: ICD-10-CM

## 2020-11-10 PROCEDURE — 99214 OFFICE O/P EST MOD 30 MIN: CPT | Performed by: INTERNAL MEDICINE

## 2020-11-10 NOTE — PATIENT INSTRUCTIONS
Will check some labs today and if normal suggest using Rogaine over-the-counter.  The ear problem is consistent with station tube dysfunction.  Suggest Flonase nasal spray 1 puff twice a day.  Suggest staying on D3 supplement 1000 units daily and chewing a few times a day.

## 2020-11-11 LAB
25(OH)D3+25(OH)D2 SERPL-MCNC: 49.8 NG/ML (ref 30–100)
BASOPHILS # BLD AUTO: 0.04 10*3/MM3 (ref 0–0.2)
BASOPHILS NFR BLD AUTO: 0.9 % (ref 0–1.5)
EOSINOPHIL # BLD AUTO: 0.12 10*3/MM3 (ref 0–0.4)
EOSINOPHIL NFR BLD AUTO: 2.7 % (ref 0.3–6.2)
ERYTHROCYTE [DISTWIDTH] IN BLOOD BY AUTOMATED COUNT: 12.4 % (ref 12.3–15.4)
FERRITIN SERPL-MCNC: 248 NG/ML (ref 13–150)
HCT VFR BLD AUTO: 38.9 % (ref 34–46.6)
HGB BLD-MCNC: 12.7 G/DL (ref 12–15.9)
IMM GRANULOCYTES # BLD AUTO: 0.01 10*3/MM3 (ref 0–0.05)
IMM GRANULOCYTES NFR BLD AUTO: 0.2 % (ref 0–0.5)
IRON SATN MFR SERPL: 19 % (ref 20–50)
IRON SERPL-MCNC: 84 MCG/DL (ref 37–145)
LYMPHOCYTES # BLD AUTO: 2.04 10*3/MM3 (ref 0.7–3.1)
LYMPHOCYTES NFR BLD AUTO: 46.5 % (ref 19.6–45.3)
Lab: NORMAL
MCH RBC QN AUTO: 31.5 PG (ref 26.6–33)
MCHC RBC AUTO-ENTMCNC: 32.6 G/DL (ref 31.5–35.7)
MCV RBC AUTO: 96.5 FL (ref 79–97)
MONOCYTES # BLD AUTO: 0.48 10*3/MM3 (ref 0.1–0.9)
MONOCYTES NFR BLD AUTO: 10.9 % (ref 5–12)
NEUTROPHILS # BLD AUTO: 1.7 10*3/MM3 (ref 1.7–7)
NEUTROPHILS NFR BLD AUTO: 38.8 % (ref 42.7–76)
NRBC BLD AUTO-RTO: 0 /100 WBC (ref 0–0.2)
PLATELET # BLD AUTO: 114 10*3/MM3 (ref 140–450)
RBC # BLD AUTO: 4.03 10*6/MM3 (ref 3.77–5.28)
TIBC SERPL-MCNC: 438 MCG/DL
TSH SERPL DL<=0.005 MIU/L-ACNC: 1.7 UIU/ML (ref 0.27–4.2)
UIBC SERPL-MCNC: 354 MCG/DL (ref 112–346)
WBC # BLD AUTO: 4.39 10*3/MM3 (ref 3.4–10.8)

## 2021-01-22 ENCOUNTER — OFFICE VISIT (OUTPATIENT)
Dept: FAMILY MEDICINE CLINIC | Facility: CLINIC | Age: 71
End: 2021-01-22

## 2021-01-22 VITALS
SYSTOLIC BLOOD PRESSURE: 124 MMHG | OXYGEN SATURATION: 94 % | HEIGHT: 62 IN | TEMPERATURE: 96 F | WEIGHT: 148.6 LBS | DIASTOLIC BLOOD PRESSURE: 70 MMHG | BODY MASS INDEX: 27.34 KG/M2 | HEART RATE: 72 BPM | RESPIRATION RATE: 20 BRPM

## 2021-01-22 DIAGNOSIS — R03.0 ELEVATED BLOOD PRESSURE READING WITHOUT DIAGNOSIS OF HYPERTENSION: Primary | ICD-10-CM

## 2021-01-22 DIAGNOSIS — E03.9 ACQUIRED HYPOTHYROIDISM: ICD-10-CM

## 2021-01-22 DIAGNOSIS — E78.5 HYPERLIPIDEMIA, UNSPECIFIED HYPERLIPIDEMIA TYPE: ICD-10-CM

## 2021-01-22 DIAGNOSIS — R79.89 ELEVATED SERUM CREATININE: ICD-10-CM

## 2021-01-22 DIAGNOSIS — R73.01 ELEVATED FASTING GLUCOSE: ICD-10-CM

## 2021-01-22 PROCEDURE — 99214 OFFICE O/P EST MOD 30 MIN: CPT | Performed by: INTERNAL MEDICINE

## 2021-01-22 NOTE — PATIENT INSTRUCTIONS
Blood pressure is well controlled.  Lipids are normal.  Serum creatinine and BUN are elevated.  We will also check a microalbumin creatinine ratio today.  Thyroid-stimulating hormone level is normal.  Platelet count is mildly decreased.  Continue diet, exercise, and current medicines.

## 2021-01-22 NOTE — PROGRESS NOTES
Subjective   Rosalina Ferrell is a 71 y.o. female. Patient is here today for   Chief Complaint   Patient presents with   • Fatigue     6 month fup lab review c/o hair loss fatigue thyroid issues    • Hypothyroidism   • Hyperlipidemia          Vitals:    01/22/21 1017   BP: 124/70   Pulse: 72   Resp: 20   Temp: 96 °F (35.6 °C)   SpO2: 94%     Body mass index is 27.18 kg/m².      The following portions of the patient's history were reviewed and updated as appropriate: allergies, current medications, past family history, past medical history, past social history, past surgical history and problem list.    Past Medical History:   Diagnosis Date   • Hyperlipidemia    • Hypothyroidism    • Urinary incontinence       No Known Allergies   Social History     Socioeconomic History   • Marital status:      Spouse name: Not on file   • Number of children: Not on file   • Years of education: Not on file   • Highest education level: Not on file   Tobacco Use   • Smoking status: Never Smoker   • Smokeless tobacco: Never Used   Substance and Sexual Activity   • Alcohol use: No   • Drug use: Defer   • Sexual activity: Defer        Current Outpatient Medications:   •  aspirin 81 MG tablet, Take 81 mg by mouth Daily., Disp: , Rfl:   •  divalproex (DEPAKOTE) 250 MG EC tablet, Take by mouth., Disp: , Rfl:   •  levothyroxine (SYNTHROID, LEVOTHROID) 75 MCG tablet, Take 75 mcg by mouth Daily., Disp: , Rfl:   •  Omega-3 Fatty Acids (FISH OIL) 1000 MG capsule capsule, Take  by mouth Daily With Breakfast., Disp: , Rfl:   •  propranolol (INDERAL) 40 MG tablet, Take by mouth., Disp: , Rfl:   •  QUEtiapine (SEROquel) 100 MG tablet, , Disp: , Rfl:   •  simvastatin (ZOCOR) 40 MG tablet, TAKE 1 TABLET EVERY NIGHT, Disp: 90 tablet, Rfl: 3  •  lithium (ESKALITH) 450 MG CR tablet, Take by mouth., Disp: , Rfl:   •  RESTASIS 0.05 % ophthalmic emulsion, , Disp: , Rfl:      Objective   History of Present Illness Rosalina is here for blood pressure check and  lab follow-up.  She has hypertension, hyperlipidemia, hypothyroidism, tremor and parkinsonism, and bipolar illness followed by psychiatry.  She feels well.  She eats healthy and exercises on a regular basis with her .  She has not been monitoring her blood pressure.  She was seen a few months ago with complaints of hair loss.  Thyroid studies and iron levels were normal.    Review of Systems   Constitutional:        10 lb weight loss   Respiratory: Negative for cough and shortness of breath.    Cardiovascular: Negative.    Gastrointestinal: Negative.    Genitourinary: Negative.    Psychiatric/Behavioral: Negative for behavioral problems and confusion.       Physical Exam  Vitals signs reviewed.   Constitutional:       Appearance: Normal appearance.   Cardiovascular:      Rate and Rhythm: Normal rate and regular rhythm.      Heart sounds: Normal heart sounds.      Comments: 112/70  Pulmonary:      Effort: Pulmonary effort is normal.      Breath sounds: Normal breath sounds.   Musculoskeletal:      Right lower leg: No edema.      Left lower leg: No edema.   Neurological:      Mental Status: She is alert.   Psychiatric:         Mood and Affect: Mood normal.         Behavior: Behavior normal.         Thought Content: Thought content normal.         Judgment: Judgment normal.         ASSESSMENT     Problems Addressed this Visit        Cardiac and Vasculature    Hyperlipidemia    Elevated blood pressure reading without diagnosis of hypertension - Primary       Endocrine and Metabolic    Hypothyroidism    Elevated fasting glucose       Genitourinary and Reproductive     Elevated serum creatinine    Relevant Orders    Microalbumin / Creatinine Urine Ratio - Urine, Clean Catch      Diagnoses       Codes Comments    Elevated blood pressure reading without diagnosis of hypertension    -  Primary ICD-10-CM: R03.0  ICD-9-CM: 796.2     Hyperlipidemia, unspecified hyperlipidemia type     ICD-10-CM: E78.5  ICD-9-CM: 272.4      Acquired hypothyroidism     ICD-10-CM: E03.9  ICD-9-CM: 244.9     Elevated fasting glucose     ICD-10-CM: R73.01  ICD-9-CM: 790.21     Elevated serum creatinine     ICD-10-CM: R79.89  ICD-9-CM: 790.99           PLAN  There are no Patient Instructions on file for this visit.    Return in about 6 months (around 7/22/2021), or [facility], for labs BMP,tsh,cbc.  Answers for HPI/ROS submitted by the patient on 1/20/2021   What is the primary reason for your visit?: Other  Please describe your symptoms.: Follow up on blood test  Have you had these symptoms before?: No  How long have you been having these symptoms?: 5-7 days  Please list any medications you are currently taking for this condition.: q  Please describe any probable cause for these symptoms. : q

## 2021-01-23 LAB
ALBUMIN/CREAT UR: 23 MG/G CREAT (ref 0–29)
CREAT UR-MCNC: 59.2 MG/DL
MICROALBUMIN UR-MCNC: 13.9 UG/ML

## 2021-03-09 DIAGNOSIS — Z23 IMMUNIZATION DUE: ICD-10-CM

## 2021-03-20 ENCOUNTER — IMMUNIZATION (OUTPATIENT)
Dept: VACCINE CLINIC | Facility: HOSPITAL | Age: 71
End: 2021-03-20

## 2021-03-20 DIAGNOSIS — Z23 IMMUNIZATION DUE: ICD-10-CM

## 2021-03-20 PROCEDURE — 0001A: CPT | Performed by: INTERNAL MEDICINE

## 2021-03-20 PROCEDURE — 91300 HC SARSCOV02 VAC 30MCG/0.3ML IM: CPT | Performed by: INTERNAL MEDICINE

## 2021-04-10 ENCOUNTER — IMMUNIZATION (OUTPATIENT)
Dept: VACCINE CLINIC | Facility: HOSPITAL | Age: 71
End: 2021-04-10

## 2021-04-10 PROCEDURE — 91300 HC SARSCOV02 VAC 30MCG/0.3ML IM: CPT | Performed by: INTERNAL MEDICINE

## 2021-04-10 PROCEDURE — 0002A: CPT | Performed by: INTERNAL MEDICINE

## 2021-07-13 RX ORDER — SIMVASTATIN 40 MG
TABLET ORAL
Qty: 90 TABLET | Refills: 3 | Status: SHIPPED | OUTPATIENT
Start: 2021-07-13 | End: 2022-05-12

## 2021-07-21 DIAGNOSIS — Z00.00 WELLNESS EXAMINATION: Primary | ICD-10-CM

## 2021-07-24 LAB
BASOPHILS # BLD AUTO: 0.03 10*3/MM3 (ref 0–0.2)
BASOPHILS NFR BLD AUTO: 0.8 % (ref 0–1.5)
BUN SERPL-MCNC: 21 MG/DL (ref 8–23)
BUN/CREAT SERPL: 17.8 (ref 7–25)
CALCIUM SERPL-MCNC: 10.2 MG/DL (ref 8.6–10.5)
CHLORIDE SERPL-SCNC: 107 MMOL/L (ref 98–107)
CO2 SERPL-SCNC: 30.3 MMOL/L (ref 22–29)
CREAT SERPL-MCNC: 1.18 MG/DL (ref 0.57–1)
EOSINOPHIL # BLD AUTO: 0.1 10*3/MM3 (ref 0–0.4)
EOSINOPHIL NFR BLD AUTO: 2.7 % (ref 0.3–6.2)
ERYTHROCYTE [DISTWIDTH] IN BLOOD BY AUTOMATED COUNT: 12.6 % (ref 12.3–15.4)
GLUCOSE SERPL-MCNC: 93 MG/DL (ref 65–99)
HCT VFR BLD AUTO: 37.8 % (ref 34–46.6)
HGB BLD-MCNC: 12.2 G/DL (ref 12–15.9)
IMM GRANULOCYTES # BLD AUTO: 0.02 10*3/MM3 (ref 0–0.05)
IMM GRANULOCYTES NFR BLD AUTO: 0.5 % (ref 0–0.5)
LYMPHOCYTES # BLD AUTO: 1.8 10*3/MM3 (ref 0.7–3.1)
LYMPHOCYTES NFR BLD AUTO: 48 % (ref 19.6–45.3)
MCH RBC QN AUTO: 31.5 PG (ref 26.6–33)
MCHC RBC AUTO-ENTMCNC: 32.3 G/DL (ref 31.5–35.7)
MCV RBC AUTO: 97.7 FL (ref 79–97)
MONOCYTES # BLD AUTO: 0.34 10*3/MM3 (ref 0.1–0.9)
MONOCYTES NFR BLD AUTO: 9.1 % (ref 5–12)
NEUTROPHILS # BLD AUTO: 1.46 10*3/MM3 (ref 1.7–7)
NEUTROPHILS NFR BLD AUTO: 38.9 % (ref 42.7–76)
NRBC BLD AUTO-RTO: 0 /100 WBC (ref 0–0.2)
PLATELET # BLD AUTO: 100 10*3/MM3 (ref 140–450)
POTASSIUM SERPL-SCNC: 4.8 MMOL/L (ref 3.5–5.2)
RBC # BLD AUTO: 3.87 10*6/MM3 (ref 3.77–5.28)
SODIUM SERPL-SCNC: 144 MMOL/L (ref 136–145)
TSH SERPL DL<=0.005 MIU/L-ACNC: 1.52 UIU/ML (ref 0.27–4.2)
WBC # BLD AUTO: 3.75 10*3/MM3 (ref 3.4–10.8)

## 2021-08-06 ENCOUNTER — OFFICE VISIT (OUTPATIENT)
Dept: FAMILY MEDICINE CLINIC | Facility: CLINIC | Age: 71
End: 2021-08-06

## 2021-08-06 VITALS
HEART RATE: 51 BPM | DIASTOLIC BLOOD PRESSURE: 82 MMHG | SYSTOLIC BLOOD PRESSURE: 122 MMHG | BODY MASS INDEX: 26.91 KG/M2 | HEIGHT: 62 IN | OXYGEN SATURATION: 98 % | TEMPERATURE: 96.6 F | RESPIRATION RATE: 18 BRPM | WEIGHT: 146.2 LBS

## 2021-08-06 DIAGNOSIS — E03.9 ACQUIRED HYPOTHYROIDISM: ICD-10-CM

## 2021-08-06 DIAGNOSIS — M85.80 OSTEOPENIA, UNSPECIFIED LOCATION: ICD-10-CM

## 2021-08-06 DIAGNOSIS — R73.01 IMPAIRED FASTING GLUCOSE: ICD-10-CM

## 2021-08-06 DIAGNOSIS — D69.6 THROMBOCYTOPENIA (HCC): ICD-10-CM

## 2021-08-06 DIAGNOSIS — E78.5 HYPERLIPIDEMIA, UNSPECIFIED HYPERLIPIDEMIA TYPE: Primary | ICD-10-CM

## 2021-08-06 PROBLEM — G20.C PARKINSONISM: Status: RESOLVED | Noted: 2018-08-01 | Resolved: 2021-08-06

## 2021-08-06 PROBLEM — G31.83 LEWY BODY DEMENTIA: Status: RESOLVED | Noted: 2018-08-01 | Resolved: 2021-08-06

## 2021-08-06 PROBLEM — G20 PARKINSONISM: Status: RESOLVED | Noted: 2018-08-01 | Resolved: 2021-08-06

## 2021-08-06 PROBLEM — F02.80 LEWY BODY DEMENTIA: Status: RESOLVED | Noted: 2018-08-01 | Resolved: 2021-08-06

## 2021-08-06 PROCEDURE — 99214 OFFICE O/P EST MOD 30 MIN: CPT | Performed by: INTERNAL MEDICINE

## 2021-08-06 RX ORDER — LEVOTHYROXINE SODIUM 0.07 MG/1
75 TABLET ORAL DAILY
Qty: 90 TABLET | Refills: 3 | Status: SHIPPED | OUTPATIENT
Start: 2021-08-06 | End: 2022-05-18

## 2021-08-06 NOTE — PROGRESS NOTES
Subjective   Rosalina Ferrell is a 71 y.o. female. Patient is here today for   Chief Complaint   Patient presents with   • Hypothyroidism     lab fu          Vitals:    08/06/21 0956   BP: 122/82   Pulse: 51   Resp: 18   Temp: 96.6 °F (35.9 °C)   SpO2: 98%     Body mass index is 26.73 kg/m².      The following portions of the patient's history were reviewed and updated as appropriate: allergies, current medications, past family history, past medical history, past social history, past surgical history and problem list.    Past Medical History:   Diagnosis Date   • Hyperlipidemia    • Hypothyroidism    • Urinary incontinence       No Known Allergies   Social History     Socioeconomic History   • Marital status:      Spouse name: Not on file   • Number of children: Not on file   • Years of education: Not on file   • Highest education level: Not on file   Tobacco Use   • Smoking status: Never Smoker   • Smokeless tobacco: Never Used   Substance and Sexual Activity   • Alcohol use: No   • Drug use: Defer   • Sexual activity: Defer        Current Outpatient Medications:   •  aspirin 81 MG tablet, Take 81 mg by mouth Daily., Disp: , Rfl:   •  divalproex (DEPAKOTE) 250 MG EC tablet, Take by mouth., Disp: , Rfl:   •  levothyroxine (SYNTHROID, LEVOTHROID) 75 MCG tablet, Take 1 tablet by mouth Daily., Disp: 90 tablet, Rfl: 3  •  Omega-3 Fatty Acids (FISH OIL) 1000 MG capsule capsule, Take  by mouth Daily With Breakfast., Disp: , Rfl:   •  propranolol (INDERAL) 40 MG tablet, Take by mouth., Disp: , Rfl:   •  QUEtiapine (SEROquel) 100 MG tablet, , Disp: , Rfl:   •  simvastatin (ZOCOR) 40 MG tablet, TAKE 1 TABLET EVERY NIGHT, Disp: 90 tablet, Rfl: 3     Objective   History of Present Illness Rosalina is here for blood pressure check and lab follow-up.  She has hypothyroidism, hyperlipidemia, elevated creatinine, osteopenia, bipolar disorder.  She is followed by psychiatry and was taken off of lithium about a year ago.  She had a  tremor which has improved off of lithium.  She recently moved and has been physically active.  Her weight is stable.  She continues to eat healthy.    Review of Systems   Constitutional: Negative for activity change and unexpected weight change.   Respiratory: Negative.    Cardiovascular: Negative.    Gastrointestinal: Negative.    Genitourinary: Negative.    Psychiatric/Behavioral:        As in HPI       Physical Exam  Vitals reviewed.   Constitutional:       Appearance: Normal appearance.   Cardiovascular:      Rate and Rhythm: Normal rate and regular rhythm.      Heart sounds: Normal heart sounds.      Comments: 120/62  Musculoskeletal:      Right lower leg: No edema.      Left lower leg: No edema.   Neurological:      Mental Status: She is alert.   Psychiatric:         Mood and Affect: Mood normal.         Behavior: Behavior normal.         Thought Content: Thought content normal.         Judgment: Judgment normal.         ASSESSMENT blood pressure remains normal.  Thyroid-stimulating hormone level is normal.  We will continue levothyroxine at current dose.  Serum creatinine is mildly elevated and improved with hydration.  Platelet count is decreased at 100.  Discussed importance of a regular exercise program.  We will continue current other medicines.     Problems Addressed this Visit        Cardiac and Vasculature    Hyperlipidemia - Primary       Coag and Thromboembolic    Thrombocytopenia (CMS/[facility])       Endocrine and Metabolic    Hypothyroidism    Relevant Medications    levothyroxine (SYNTHROID, LEVOTHROID) 75 MCG tablet    Impaired fasting glucose       Musculoskeletal and Injuries    Osteopenia      Diagnoses       Codes Comments    Hyperlipidemia, unspecified hyperlipidemia type    -  Primary ICD-10-CM: E78.5  ICD-9-CM: 272.4     Acquired hypothyroidism     ICD-10-CM: E03.9  ICD-9-CM: 244.9     Impaired fasting glucose     ICD-10-CM: R73.01  ICD-9-CM: 790.21     Osteopenia, unspecified location      ICD-10-CM: M85.80  ICD-9-CM: 733.90     Thrombocytopenia (CMS/HCC)     ICD-10-CM: D69.6  ICD-9-CM: 287.5           PLAN  There are no Patient Instructions on file for this visit.    No follow-ups on file.

## 2021-08-12 ENCOUNTER — TRANSCRIBE ORDERS (OUTPATIENT)
Dept: ADMINISTRATIVE | Facility: HOSPITAL | Age: 71
End: 2021-08-12

## 2021-08-12 DIAGNOSIS — Z12.31 BREAST CANCER SCREENING BY MAMMOGRAM: Primary | ICD-10-CM

## 2021-09-14 ENCOUNTER — APPOINTMENT (OUTPATIENT)
Dept: MAMMOGRAPHY | Facility: HOSPITAL | Age: 71
End: 2021-09-14

## 2021-11-09 ENCOUNTER — IMMUNIZATION (OUTPATIENT)
Dept: VACCINE CLINIC | Facility: HOSPITAL | Age: 71
End: 2021-11-09

## 2021-11-09 PROCEDURE — 0004A ADM SARSCOV2 30MCG/0.3ML BOOSTER: CPT | Performed by: INTERNAL MEDICINE

## 2021-11-09 PROCEDURE — 91300 HC SARSCOV02 VAC 30MCG/0.3ML IM: CPT | Performed by: INTERNAL MEDICINE

## 2021-12-14 ENCOUNTER — APPOINTMENT (OUTPATIENT)
Dept: MAMMOGRAPHY | Facility: HOSPITAL | Age: 71
End: 2021-12-14

## 2022-02-07 DIAGNOSIS — E03.9 HYPOTHYROIDISM, UNSPECIFIED TYPE: ICD-10-CM

## 2022-02-07 DIAGNOSIS — E78.5 HYPERLIPIDEMIA, UNSPECIFIED HYPERLIPIDEMIA TYPE: Primary | ICD-10-CM

## 2022-02-08 LAB
ALBUMIN SERPL-MCNC: 4.2 G/DL (ref 3.7–4.7)
ALBUMIN/GLOB SERPL: 2.2 {RATIO} (ref 1.2–2.2)
ALP SERPL-CCNC: 56 IU/L (ref 44–121)
ALT SERPL-CCNC: 8 IU/L (ref 0–32)
AST SERPL-CCNC: 14 IU/L (ref 0–40)
BASOPHILS # BLD AUTO: 0 X10E3/UL (ref 0–0.2)
BASOPHILS NFR BLD AUTO: 1 %
BILIRUB SERPL-MCNC: 0.3 MG/DL (ref 0–1.2)
BUN SERPL-MCNC: 21 MG/DL (ref 8–27)
BUN/CREAT SERPL: 18 (ref 12–28)
CALCIUM SERPL-MCNC: 9.8 MG/DL (ref 8.7–10.3)
CHLORIDE SERPL-SCNC: 105 MMOL/L (ref 96–106)
CHOLEST SERPL-MCNC: 163 MG/DL (ref 100–199)
CO2 SERPL-SCNC: 24 MMOL/L (ref 20–29)
CREAT SERPL-MCNC: 1.16 MG/DL (ref 0.57–1)
EOSINOPHIL # BLD AUTO: 0.1 X10E3/UL (ref 0–0.4)
EOSINOPHIL NFR BLD AUTO: 3 %
ERYTHROCYTE [DISTWIDTH] IN BLOOD BY AUTOMATED COUNT: 12.6 % (ref 11.7–15.4)
GLOBULIN SER CALC-MCNC: 1.9 G/DL (ref 1.5–4.5)
GLUCOSE SERPL-MCNC: 95 MG/DL (ref 65–99)
HCT VFR BLD AUTO: 35.4 % (ref 34–46.6)
HDLC SERPL-MCNC: 48 MG/DL
HGB BLD-MCNC: 12.2 G/DL (ref 11.1–15.9)
IMM GRANULOCYTES # BLD AUTO: 0 X10E3/UL (ref 0–0.1)
IMM GRANULOCYTES NFR BLD AUTO: 1 %
LDLC SERPL CALC-MCNC: 99 MG/DL (ref 0–99)
LDLC/HDLC SERPL: 2.1 RATIO (ref 0–3.2)
LYMPHOCYTES # BLD AUTO: 1.6 X10E3/UL (ref 0.7–3.1)
LYMPHOCYTES NFR BLD AUTO: 42 %
MCH RBC QN AUTO: 32.8 PG (ref 26.6–33)
MCHC RBC AUTO-ENTMCNC: 34.5 G/DL (ref 31.5–35.7)
MCV RBC AUTO: 95 FL (ref 79–97)
MONOCYTES # BLD AUTO: 0.4 X10E3/UL (ref 0.1–0.9)
MONOCYTES NFR BLD AUTO: 12 %
NEUTROPHILS # BLD AUTO: 1.5 X10E3/UL (ref 1.4–7)
NEUTROPHILS NFR BLD AUTO: 41 %
PLATELET # BLD AUTO: 133 X10E3/UL (ref 150–450)
POTASSIUM SERPL-SCNC: 4.6 MMOL/L (ref 3.5–5.2)
PROT SERPL-MCNC: 6.1 G/DL (ref 6–8.5)
RBC # BLD AUTO: 3.72 X10E6/UL (ref 3.77–5.28)
SODIUM SERPL-SCNC: 142 MMOL/L (ref 134–144)
TRIGL SERPL-MCNC: 88 MG/DL (ref 0–149)
TSH SERPL DL<=0.005 MIU/L-ACNC: 1.82 UIU/ML (ref 0.45–4.5)
VLDLC SERPL CALC-MCNC: 16 MG/DL (ref 5–40)
WBC # BLD AUTO: 3.7 X10E3/UL (ref 3.4–10.8)

## 2022-02-14 ENCOUNTER — APPOINTMENT (OUTPATIENT)
Dept: MAMMOGRAPHY | Facility: HOSPITAL | Age: 72
End: 2022-02-14

## 2022-02-14 ENCOUNTER — OFFICE VISIT (OUTPATIENT)
Dept: FAMILY MEDICINE CLINIC | Facility: CLINIC | Age: 72
End: 2022-02-14

## 2022-02-14 ENCOUNTER — TELEPHONE (OUTPATIENT)
Dept: FAMILY MEDICINE CLINIC | Facility: CLINIC | Age: 72
End: 2022-02-14

## 2022-02-14 DIAGNOSIS — Z20.822 EXPOSURE TO COVID-19 VIRUS: Primary | ICD-10-CM

## 2022-02-14 PROBLEM — H40.1210: Status: ACTIVE | Noted: 2021-08-24

## 2022-02-14 PROBLEM — H40.009 BORDERLINE GLAUCOMA: Status: ACTIVE | Noted: 2021-08-24

## 2022-02-14 PROBLEM — H02.883 MEIBOMIAN GLAND DYSFUNCTION OF RIGHT EYE: Status: ACTIVE | Noted: 2020-02-12

## 2022-02-14 PROCEDURE — 99442 PR PHYS/QHP TELEPHONE EVALUATION 11-20 MIN: CPT | Performed by: NURSE PRACTITIONER

## 2022-02-14 RX ORDER — QUETIAPINE FUMARATE 25 MG/1
25 TABLET, FILM COATED ORAL DAILY
COMMUNITY
End: 2022-02-14

## 2022-02-14 NOTE — PATIENT INSTRUCTIONS
Drink plenty of fluids-water preferably, eat a heart healthy diet, and get plenty of sleep; pt informed to stay in quarantine until results of covid19 testing back. Pt verb. Understanding.

## 2022-02-14 NOTE — TELEPHONE ENCOUNTER
Caller: Lorenzo Ferrell    Relationship to patient: Emergency Contact    Best call back number: [phone]    Date of exposure: 2/8    NO SYMPTOMS    EXPOSED TO SON.  HAS SYMPTOMS    REQUESTING COVID TEST

## 2022-02-14 NOTE — PROGRESS NOTES
Subjective     Rosalina Ferrell is a 72 y.o.. female.     You have chosen to receive care through a telephone visit. Do you consent to use a telephone visit for your medical care today? yes    Pt's visit today for c/o exposure to covid19 on 2/9/22. Pt's son was dx with covid on 2/10/22 and pt was around her son on 2/9/22. Pt denies any symptoms this time. Pt's , which she lives with and was also exposed to son, is having some symptoms. Pt requesting covid testing.       The following portions of the patient's history were reviewed and updated as appropriate: allergies, current medications, past family history, past medical history, past social history, past surgical history and problem list.    Past Medical History:   Diagnosis Date   • Hyperlipidemia    • Hypothyroidism    • Urinary incontinence        Past Surgical History:   Procedure Laterality Date   • BREAST BIOPSY     • HYSTERECTOMY     • OOPHORECTOMY         Review of Systems   Constitutional: Negative.    HENT: Negative.    Respiratory: Negative.    Gastrointestinal: Negative.    Musculoskeletal: Negative.    Neurological: Negative.        No Known Allergies    Objective     There were no vitals filed for this visit.; telephone visit  There is no height or weight on file to calculate BMI.; telephone visit    Physical Exam; telephone visit      Current Outpatient Medications:   •  aspirin 81 MG tablet, Take 81 mg by mouth Daily., Disp: , Rfl:   •  divalproex (DEPAKOTE) 250 MG EC tablet, Take by mouth., Disp: , Rfl:   •  levothyroxine (SYNTHROID, LEVOTHROID) 75 MCG tablet, Take 1 tablet by mouth Daily., Disp: 90 tablet, Rfl: 3  •  Omega-3 Fatty Acids (FISH OIL) 1000 MG capsule capsule, Take  by mouth Daily With Breakfast., Disp: , Rfl:   •  propranolol (INDERAL) 40 MG tablet, Take by mouth., Disp: , Rfl:   •  QUEtiapine (SEROquel) 100 MG tablet, , Disp: , Rfl:   •  simvastatin (ZOCOR) 40 MG tablet, TAKE 1 TABLET EVERY NIGHT, Disp: 90 tablet, Rfl: 3    Time:  20 minutes    Assessment/Plan   Diagnoses and all orders for this visit:    1. Exposure to COVID-19 virus (Primary)  -     COVID-19,[facility] ROUTINE, NP/OP SWAB IN TRANSPORT MEDIA OR ESWAB 72 HR TAT - Swab, Nasopharynx        Patient Instructions   Drink plenty of fluids-water preferably, eat a heart healthy diet, and get plenty of sleep; pt informed to stay in quarantine until results of covid19 testing back. Pt verb. Understanding.      Return if symptoms worsen or fail to improve.

## 2022-02-15 LAB
LABCORP SARS-COV-2, NAA 2 DAY TAT: NORMAL
SARS-COV-2 RNA RESP QL NAA+PROBE: NOT DETECTED

## 2022-03-04 ENCOUNTER — OFFICE VISIT (OUTPATIENT)
Dept: FAMILY MEDICINE CLINIC | Facility: CLINIC | Age: 72
End: 2022-03-04

## 2022-03-04 VITALS
RESPIRATION RATE: 18 BRPM | TEMPERATURE: 96.4 F | OXYGEN SATURATION: 98 % | BODY MASS INDEX: 25.28 KG/M2 | HEIGHT: 62 IN | DIASTOLIC BLOOD PRESSURE: 82 MMHG | SYSTOLIC BLOOD PRESSURE: 139 MMHG | WEIGHT: 137.4 LBS | HEART RATE: 105 BPM

## 2022-03-04 DIAGNOSIS — N18.31 STAGE 3A CHRONIC KIDNEY DISEASE: ICD-10-CM

## 2022-03-04 DIAGNOSIS — D69.6 THROMBOCYTOPENIA: ICD-10-CM

## 2022-03-04 DIAGNOSIS — I83.93 VARICOSE VEINS OF BOTH LOWER EXTREMITIES, UNSPECIFIED WHETHER COMPLICATED: ICD-10-CM

## 2022-03-04 DIAGNOSIS — R03.0 ELEVATED BLOOD PRESSURE READING WITHOUT DIAGNOSIS OF HYPERTENSION: ICD-10-CM

## 2022-03-04 DIAGNOSIS — E78.5 HYPERLIPIDEMIA, UNSPECIFIED HYPERLIPIDEMIA TYPE: Primary | ICD-10-CM

## 2022-03-04 DIAGNOSIS — E03.9 ACQUIRED HYPOTHYROIDISM: ICD-10-CM

## 2022-03-04 PROCEDURE — 1170F FXNL STATUS ASSESSED: CPT | Performed by: INTERNAL MEDICINE

## 2022-03-04 PROCEDURE — 96160 PT-FOCUSED HLTH RISK ASSMT: CPT | Performed by: INTERNAL MEDICINE

## 2022-03-04 PROCEDURE — G0439 PPPS, SUBSEQ VISIT: HCPCS | Performed by: INTERNAL MEDICINE

## 2022-03-04 PROCEDURE — 1159F MED LIST DOCD IN RCRD: CPT | Performed by: INTERNAL MEDICINE

## 2022-03-04 PROCEDURE — 99214 OFFICE O/P EST MOD 30 MIN: CPT | Performed by: INTERNAL MEDICINE

## 2022-03-04 NOTE — PROGRESS NOTES
The ABCs of the Annual Wellness Visit  Subsequent Medicare Wellness Visit    Chief Complaint   Patient presents with   • Medicare Wellness-subsequent      Subjective    History of Present Illness:  Rosalina Ferrell is a 72 y.o. female who presents for a Subsequent Medicare Wellness Visit, blood pressure check, and lab follow-up.  She has hypothyroidism, chronic kidney disease, hyperlipidemia, elevated blood pressure without a diagnosis of hypertension, and bipolar illness.  She feels well.  She eats healthy and exercises on a regular basis.  She has lost some weight in the last 6 months.  She is followed by psychiatry.  She complains of bilateral lower extremity varicose veins and she would like to see a vein specialist.    The following portions of the patient's history were reviewed and   updated as appropriate: allergies, current medications, past family history, past medical history, past social history, past surgical history and problem list.    Compared to one year ago, the patient feels her physical   health is the same.    Compared to one year ago, the patient feels her mental   health is the same.    Recent Hospitalizations:  She was not admitted to the hospital during the last year.       Current Medical Providers:  Patient Care Team:  Kavon Wheat MD as PCP - General (Internal Medicine)    Outpatient Medications Prior to Visit   Medication Sig Dispense Refill   • aspirin 81 MG tablet Take 81 mg by mouth Daily.     • divalproex (DEPAKOTE) 250 MG EC tablet Take by mouth.     • levothyroxine (SYNTHROID, LEVOTHROID) 75 MCG tablet Take 1 tablet by mouth Daily. 90 tablet 3   • Omega-3 Fatty Acids (FISH OIL) 1000 MG capsule capsule Take  by mouth Daily With Breakfast.     • propranolol (INDERAL) 40 MG tablet Take by mouth.     • QUEtiapine (SEROquel) 100 MG tablet      • simvastatin (ZOCOR) 40 MG tablet TAKE 1 TABLET EVERY NIGHT 90 tablet 3     No facility-administered medications prior to visit.       No  opioid medication identified on active medication list. I have reviewed chart for other potential  high risk medication/s and harmful drug interactions in the elderly.          Aspirin is on active medication list. Aspirin use is indicated based on review of current medical condition/s. Pros and cons of this therapy have been discussed today. Benefits of this medication outweigh potential harm.  Patient has been encouraged to continue taking this medication.  .      Patient Active Problem List   Diagnosis   • Vitamin D deficiency   • Urinary incontinence   • Compression fracture of vertebral column ([facility])   • Hypothyroidism   • Osteopenia   • Hypernatremia   • Hyperlipidemia   • Hypertonicity of bladder   • Impaired fasting glucose   • Elevated aspartate aminotransferase level   • Elevated alanine aminotransferase (ALT) level   • Bipolar I disorder, single manic episode ([facility])   • Generalized abdominal pain   • Gastroesophageal reflux disease without esophagitis   • Tremor   • Elevated fasting glucose   • Constipation   • MCI (mild cognitive impairment)   • Medication-induced postural tremor   • Hallucinations   • Elevated blood pressure reading without diagnosis of hypertension   • Hair loss   • Elevated serum creatinine   • Thrombocytopenia ([facility])   • Borderline glaucoma   • Glaucomatous atrophy of optic disc   • Low-tension glaucoma of right eye   • Meibomian gland dysfunction of right eye   • Varicose veins of both lower extremities   • Stage 3a chronic kidney disease ([facility])     Advance Care Planning  Advance Directive is on file.  ACP discussion was held with the patient during this visit. Patient has an advance directive in EMR which is still valid.     Review of Systems   Constitutional: Negative.    Respiratory: Negative.    Cardiovascular: Negative.    Gastrointestinal: Negative.    Genitourinary: Negative.    Musculoskeletal: Negative.    Neurological: Negative.    Psychiatric/Behavioral:        As in HPI       "  Objective    Vitals:    03/04/22 1320   BP: 139/82   BP Location: Left arm   Patient Position: Sitting   Pulse: 105   Resp: 18   Temp: 96.4 °F (35.8 °C)   TempSrc: Oral   SpO2: 98%   Weight: 62.3 kg (137 lb 6.4 oz)   Height: 157.5 cm (62.01\")     BMI Readings from Last 1 Encounters:   03/04/22 25.12 kg/m²   BMI is above normal parameters. Recommendations include: exercise counseling and nutrition counseling    Does the patient have evidence of cognitive impairment? No    Physical Exam  Vitals reviewed.   Constitutional:       Appearance: Normal appearance.   Cardiovascular:      Rate and Rhythm: Normal rate and regular rhythm.      Heart sounds: Normal heart sounds.      Comments: 130/70  Pulmonary:      Effort: Pulmonary effort is normal.      Breath sounds: Normal breath sounds.   Musculoskeletal:      Right lower leg: No edema.      Left lower leg: No edema.   Neurological:      Mental Status: She is alert.   Psychiatric:         Mood and Affect: Mood normal.         Behavior: Behavior normal.         Thought Content: Thought content normal.         Judgment: Judgment normal.       Lab Results   Component Value Date    CHLPL 163 02/07/2022    TRIG 88 02/07/2022    HDL 48 02/07/2022    LDL 99 02/07/2022    VLDL 16 02/07/2022            HEALTH RISK ASSESSMENT    Smoking Status:  Social History     Tobacco Use   Smoking Status Never Smoker   Smokeless Tobacco Never Used     Alcohol Consumption:  Social History     Substance and Sexual Activity   Alcohol Use No     Fall Risk Screen:    STEADI Fall Risk Assessment was completed, and patient is at LOW risk for falls.Assessment completed on:3/4/2022    Depression Screening:  PHQ-2/PHQ-9 Depression Screening 3/4/2022   Little interest or pleasure in doing things 0   Feeling down, depressed, or hopeless 0   Trouble falling or staying asleep, or sleeping too much -   Feeling tired or having little energy -   Poor appetite or overeating -   Feeling bad about yourself - or " that you are a failure or have let yourself or your family down -   Trouble concentrating on things, such as reading the newspaper or watching television -   Moving or speaking so slowly that other people could have noticed. Or the opposite - being so fidgety or restless that you have been moving around a lot more than usual -   Thoughts that you would be better off dead, or of hurting yourself in some way -   Total Score 0       Health Habits and Functional and Cognitive Screening:  Functional & Cognitive Status 3/4/2022   Do you have difficulty preparing food and eating? No   Do you have difficulty bathing yourself, getting dressed or grooming yourself? No   Do you have difficulty using the toilet? No   Do you have difficulty moving around from place to place? No   Do you have trouble with steps or getting out of a bed or a chair? No   Current Diet Well Balanced Diet   Dental Exam Up to date   Eye Exam Up to date   Exercise (times per week) 4 times per week        Exercise Frequency Comment -   Current Exercises Include Walking;Other;House Cleaning;Light Weights;Treadmill   Current Exercise Activities Include -   Do you need help using the phone?  No   Are you deaf or do you have serious difficulty hearing?  No   Do you need help with transportation? No   Do you need help shopping? No   Do you need help preparing meals?  No   Do you need help with housework?  No   Do you need help with laundry? No   Do you need help taking your medications? No   Do you need help managing money? No   Do you ever drive or ride in a car without wearing a seat belt? No   Have you felt unusual stress, anger or loneliness in the last month? No   Who do you live with? Spouse   If you need help, do you have trouble finding someone available to you? No   Have you been bothered in the last four weeks by sexual problems? -   Do you have difficulty concentrating, remembering or making decisions? No       Age-appropriate Screening  Schedule:  Refer to the list below for future screening recommendations based on patient's age, sex and/or medical conditions. Orders for these recommended tests are listed in the plan section. The patient has been provided with a written plan.    Health Maintenance   Topic Date Due   • TDAP/TD VACCINES (1 - Tdap) 03/04/2022 (Originally 1/4/1969)   • DXA SCAN  03/19/2022   • MAMMOGRAM  07/27/2022   • LIPID PANEL  02/07/2023   • INFLUENZA VACCINE  Completed   • ZOSTER VACCINE  Completed              Assessment/Plan Blood pressure is borderline high.  You need to monitor your blood pressure correctly at home while resting relaxed as instructed.  Normal is less than 120/80.  Lipids are normal.  Thyroid-stimulating hormone level is normal.  Kidney functions are stable.  Platelet count is mildly decreased and stable.  Continue healthy heart diet and regular exercise.  We will continue current medicines.  Will refer to vein specialist.  CMS Preventative Services Quick Reference  Risk Factors Identified During Encounter  Immunizations Discussed/Encouraged (specific Immunizations; Tdap, Hepatitis A Vaccine/Series, Hepatitis B Vaccine/Series, Influenza, Pneumococcal 23, Shingrix and COVID19  The above risks/problems have been discussed with the patient.  Follow up actions/plans if indicated are seen below in the Assessment/Plan Section.  Pertinent information has been shared with the patient in the After Visit Summary.    Diagnoses and all orders for this visit:    1. Hyperlipidemia, unspecified hyperlipidemia type (Primary)    2. Elevated blood pressure reading without diagnosis of hypertension    3. Varicose veins of both lower extremities, unspecified whether complicated    4. Thrombocytopenia (HCC)    5. Acquired hypothyroidism    6. Stage 3a chronic kidney disease (HCC)        Follow Up:   No follow-ups on file.     An After Visit Summary and PPPS were made available to the patient.    {Optional Chart Navigation Links                 Answers for HPI/ROS submitted by the patient on 2/23/2022  Please describe your symptoms.: Follow up to blood test  Have you had these symptoms before?: No  How long have you been having these symptoms?: 1-4 days  Please list any medications you are currently taking for this condition.: 1  Please describe any probable cause for these symptoms. : 1  What is the primary reason for your visit?: Other

## 2022-03-17 ENCOUNTER — CLINICAL SUPPORT (OUTPATIENT)
Dept: FAMILY MEDICINE CLINIC | Facility: CLINIC | Age: 72
End: 2022-03-17

## 2022-03-17 PROCEDURE — 90471 IMMUNIZATION ADMIN: CPT | Performed by: INTERNAL MEDICINE

## 2022-03-17 PROCEDURE — 90715 TDAP VACCINE 7 YRS/> IM: CPT | Performed by: INTERNAL MEDICINE

## 2022-03-28 ENCOUNTER — TELEPHONE (OUTPATIENT)
Dept: FAMILY MEDICINE CLINIC | Facility: CLINIC | Age: 72
End: 2022-03-28

## 2022-03-28 NOTE — TELEPHONE ENCOUNTER
Spoke with patient's  who stated that Dr. Wheat gave him the name of a vascular physician who specializes with varicose veins by the name of Armida Brice. Per , they went by the office and stated that the building was closed and per them, she is no longer practicing. I was unsuccessful at looking her up online as well. Patient and  would like to know if you have any other physicians in mind that specialize in varicose veins? Please have Dr. Wheat advise.

## 2022-05-09 ENCOUNTER — HOSPITAL ENCOUNTER (OUTPATIENT)
Dept: MAMMOGRAPHY | Facility: HOSPITAL | Age: 72
Discharge: HOME | End: 2022-05-09
Admitting: INTERNAL MEDICINE

## 2022-05-09 DIAGNOSIS — Z12.31 BREAST CANCER SCREENING BY MAMMOGRAM: ICD-10-CM

## 2022-05-09 PROCEDURE — 77067 SCR MAMMO BI INCL CAD: CPT

## 2022-05-09 PROCEDURE — 77063 BREAST TOMOSYNTHESIS BI: CPT

## 2022-05-12 RX ORDER — SIMVASTATIN 40 MG
TABLET ORAL
Qty: 90 TABLET | Refills: 3 | Status: SHIPPED | OUTPATIENT
Start: 2022-05-12 | End: 2022-05-18

## 2022-05-18 RX ORDER — LEVOTHYROXINE SODIUM 0.07 MG/1
TABLET ORAL
Qty: 90 TABLET | Refills: 3 | Status: SHIPPED | OUTPATIENT
Start: 2022-05-18

## 2022-05-18 RX ORDER — SIMVASTATIN 40 MG
TABLET ORAL
Qty: 90 TABLET | Refills: 3 | Status: SHIPPED | OUTPATIENT
Start: 2022-05-18

## 2022-05-26 ENCOUNTER — OFFICE VISIT (OUTPATIENT)
Dept: FAMILY MEDICINE CLINIC | Facility: CLINIC | Age: 72
End: 2022-05-26

## 2022-05-26 VITALS
SYSTOLIC BLOOD PRESSURE: 128 MMHG | TEMPERATURE: 96.4 F | HEIGHT: 62 IN | WEIGHT: 143 LBS | OXYGEN SATURATION: 98 % | HEART RATE: 60 BPM | DIASTOLIC BLOOD PRESSURE: 74 MMHG | RESPIRATION RATE: 18 BRPM | BODY MASS INDEX: 26.31 KG/M2

## 2022-05-26 DIAGNOSIS — M25.561 ACUTE PAIN OF RIGHT KNEE: ICD-10-CM

## 2022-05-26 DIAGNOSIS — S89.91XA INJURY OF RIGHT KNEE, INITIAL ENCOUNTER: ICD-10-CM

## 2022-05-26 DIAGNOSIS — M76.51 PATELLAR TENDINITIS OF RIGHT KNEE: Primary | ICD-10-CM

## 2022-05-26 PROCEDURE — 99213 OFFICE O/P EST LOW 20 MIN: CPT | Performed by: NURSE PRACTITIONER

## 2022-05-26 RX ORDER — PREDNISONE 20 MG/1
20 TABLET ORAL 2 TIMES DAILY
Qty: 8 TABLET | Refills: 0 | Status: SHIPPED | OUTPATIENT
Start: 2022-05-26 | End: 2022-05-30

## 2022-05-26 NOTE — PATIENT INSTRUCTIONS
Rest right knee/leg; Ice right knee 2-3 times a day until feeling better; use compression (ace) bandage or soft brace to right knee when active and off at night; elevate right knee through out day, several times for about 20 minutes at a time. Follow up in 4 weeks if no improvement

## 2022-05-26 NOTE — PROGRESS NOTES
"Fatoumata Ferrell is a 72 y.o.. female.     Knee Pain   Incident onset: 1 week ago. Incident location: while visiting in [place]. Injury mechanism: try to jump over puddle, fell and landed on bottom, knees went up in air and bent upward toward chest; knees did not hit ground/concreate. The pain is present in the right knee. The quality of the pain is described as aching. Pertinent negatives include no inability to bear weight, loss of motion, numbness or tingling. Associated symptoms comments: Swollen, warm  Limps with ambulation. The symptoms are aggravated by movement. She has tried ice and rest for the symptoms.       The following portions of the patient's history were reviewed and updated as appropriate: allergies, current medications, past family history, past medical history, past social history, past surgical history and problem list.    Past Medical History:   Diagnosis Date   • Hyperlipidemia    • Hypothyroidism    • Urinary incontinence        Past Surgical History:   Procedure Laterality Date   • BREAST BIOPSY     • HYSTERECTOMY     • OOPHORECTOMY         Review of Systems   Constitutional: Negative.    Respiratory: Negative.    Cardiovascular: Negative.    Musculoskeletal: Positive for arthralgias (right knee).   Neurological: Negative for tingling and numbness.       No Known Allergies    Objective     Vitals:    05/26/22 0929   BP: 128/74   Pulse: 60   Resp: 18   Temp: 96.4 °F (35.8 °C)   TempSrc: Temporal   SpO2: 98%   Weight: 64.9 kg (143 lb)   Height: 157.5 cm (62.01\")     Body mass index is 26.15 kg/m².    Physical Exam  Vitals reviewed.   HENT:      Head: Normocephalic.   Eyes:      Pupils: Pupils are equal, round, and reactive to light.   Cardiovascular:      Rate and Rhythm: Normal rate and regular rhythm.   Pulmonary:      Effort: Pulmonary effort is normal.      Breath sounds: Normal breath sounds.   Musculoskeletal:      Right knee: No swelling, effusion, erythema, ecchymosis, bony " tenderness or crepitus. Normal range of motion. Tenderness present over the patellar tendon.      Instability Tests: Posterior drawer test negative.   Neurological:      Mental Status: She is alert and oriented to person, place, and time.   Psychiatric:         Behavior: Behavior normal.           Current Outpatient Medications:   •  aspirin 81 MG tablet, Take 81 mg by mouth Daily., Disp: , Rfl:   •  divalproex (DEPAKOTE) 250 MG EC tablet, Take by mouth., Disp: , Rfl:   •  levothyroxine (SYNTHROID, LEVOTHROID) 75 MCG tablet, TAKE 1 TABLET EVERY DAY, Disp: 90 tablet, Rfl: 3  •  Omega-3 Fatty Acids (FISH OIL) 1000 MG capsule capsule, Take  by mouth Daily With Breakfast., Disp: , Rfl:   •  propranolol (INDERAL) 40 MG tablet, Take by mouth., Disp: , Rfl:   •  QUEtiapine (SEROquel) 100 MG tablet, , Disp: , Rfl:   •  simvastatin (ZOCOR) 40 MG tablet, TAKE 1 TABLET EVERY NIGHT, Disp: 90 tablet, Rfl: 3  •  predniSONE (DELTASONE) 20 MG tablet, Take 1 tablet by mouth 2 (Two) Times a Day for 4 days., Disp: 8 tablet, Rfl: 0      Diagnoses and all orders for this visit:    1. Patellar tendinitis of right knee (Primary)  -     predniSONE (DELTASONE) 20 MG tablet; Take 1 tablet by mouth 2 (Two) Times a Day for 4 days.  Dispense: 8 tablet; Refill: 0    2. Injury of right knee, initial encounter    3. Acute pain of right knee        Patient Instructions   Rest right knee/leg; Ice right knee 2-3 times a day until feeling better; use compression (ace) bandage or soft brace to right knee when active and off at night; elevate right knee through out day, several times for about 20 minutes at a time. Follow up in 4 weeks if no improvement      Return if symptoms worsen or fail to improve.

## 2022-08-17 ENCOUNTER — TRANSCRIBE ORDERS (OUTPATIENT)
Dept: ADMINISTRATIVE | Facility: HOSPITAL | Age: 72
End: 2022-08-17

## 2022-08-17 DIAGNOSIS — Z13.6 ENCOUNTER FOR SCREENING FOR VASCULAR DISEASE: Primary | ICD-10-CM

## 2022-09-13 ENCOUNTER — OFFICE VISIT (OUTPATIENT)
Dept: FAMILY MEDICINE CLINIC | Facility: CLINIC | Age: 72
End: 2022-09-13

## 2022-09-13 VITALS
HEIGHT: 62 IN | TEMPERATURE: 97.1 F | WEIGHT: 140 LBS | DIASTOLIC BLOOD PRESSURE: 72 MMHG | BODY MASS INDEX: 25.76 KG/M2 | SYSTOLIC BLOOD PRESSURE: 110 MMHG | OXYGEN SATURATION: 99 % | HEART RATE: 52 BPM

## 2022-09-13 DIAGNOSIS — N18.31 STAGE 3A CHRONIC KIDNEY DISEASE: ICD-10-CM

## 2022-09-13 DIAGNOSIS — D69.6 THROMBOCYTOPENIA: ICD-10-CM

## 2022-09-13 DIAGNOSIS — Z78.0 POSTMENOPAUSE: ICD-10-CM

## 2022-09-13 DIAGNOSIS — E78.5 HYPERLIPIDEMIA, UNSPECIFIED HYPERLIPIDEMIA TYPE: ICD-10-CM

## 2022-09-13 DIAGNOSIS — E03.9 ACQUIRED HYPOTHYROIDISM: Primary | ICD-10-CM

## 2022-09-13 DIAGNOSIS — I10 PRIMARY HYPERTENSION: ICD-10-CM

## 2022-09-13 PROBLEM — R73.01 ELEVATED FASTING GLUCOSE: Status: RESOLVED | Noted: 2018-11-19 | Resolved: 2022-09-13

## 2022-09-13 PROBLEM — R03.0 ELEVATED BLOOD PRESSURE READING WITHOUT DIAGNOSIS OF HYPERTENSION: Status: RESOLVED | Noted: 2020-07-14 | Resolved: 2022-09-13

## 2022-09-13 PROBLEM — R79.89 ELEVATED SERUM CREATININE: Status: RESOLVED | Noted: 2021-01-22 | Resolved: 2022-09-13

## 2022-09-13 PROCEDURE — 99214 OFFICE O/P EST MOD 30 MIN: CPT | Performed by: INTERNAL MEDICINE

## 2022-09-13 RX ORDER — PROPRANOLOL HYDROCHLORIDE 80 MG/1
TABLET ORAL
COMMUNITY
Start: 2022-07-15

## 2022-09-13 RX ORDER — MELATONIN
1000 DAILY
COMMUNITY

## 2022-09-13 RX ORDER — PHENOL 1.4 %
600 AEROSOL, SPRAY (ML) MUCOUS MEMBRANE DAILY
COMMUNITY

## 2022-09-13 NOTE — PROGRESS NOTES
Subjective   Rosalina Ferrell is a 72 y.o. female. Patient is here today for   Chief Complaint   Patient presents with   • Hyperlipidemia          Vitals:    09/13/22 1508   BP: 110/72   Pulse: 52   Temp: 97.1 °F (36.2 °C)   SpO2: 99%     Body mass index is 25.6 kg/m².      The following portions of the patient's history were reviewed and updated as appropriate: allergies, current medications, past family history, past medical history, past social history, past surgical history and problem list.    Past Medical History:   Diagnosis Date   • Hyperlipidemia    • Hypothyroidism    • Urinary incontinence       No Known Allergies   Social History     Socioeconomic History   • Marital status:    Tobacco Use   • Smoking status: Never Smoker   • Smokeless tobacco: Never Used   Substance and Sexual Activity   • Alcohol use: No   • Drug use: Defer   • Sexual activity: Defer        Current Outpatient Medications:   •  aspirin 81 MG tablet, Take 81 mg by mouth Daily., Disp: , Rfl:   •  calcium carbonate (OS-KARLA) 600 MG tablet, Take 600 mg by mouth Daily., Disp: , Rfl:   •  cholecalciferol (VITAMIN D3) 25 MCG (1000 UT) tablet, Take 1,000 Units by mouth Daily., Disp: , Rfl:   •  divalproex (DEPAKOTE) 250 MG EC tablet, Take by mouth., Disp: , Rfl:   •  levothyroxine (SYNTHROID, LEVOTHROID) 75 MCG tablet, TAKE 1 TABLET EVERY DAY, Disp: 90 tablet, Rfl: 3  •  Omega-3 Fatty Acids (FISH OIL) 1000 MG capsule capsule, Take  by mouth Daily With Breakfast., Disp: , Rfl:   •  propranolol (INDERAL) 80 MG tablet, , Disp: , Rfl:   •  QUEtiapine (SEROquel) 100 MG tablet, , Disp: , Rfl:   •  simvastatin (ZOCOR) 40 MG tablet, TAKE 1 TABLET EVERY NIGHT, Disp: 90 tablet, Rfl: 3     Objective   History of Present Illness Rosalina is here for blood pressure check and lab follow-up.  She has hypertension, hyperlipidemia, hypothyroidism, mild thrombocytopenia, osteopenia, bipolar disorder.  She feels well.  She eats healthy and exercises with her   on a daily basis.  He has Parkinson's disease.  She is followed by psychiatry.  Immunizations are up-to-date.  She is due for bone density test.  She does have a history of a thoracic compression fracture in the distant past.    Review of Systems   Constitutional: Negative.    Respiratory: Negative.    Cardiovascular: Negative.    Gastrointestinal: Negative.    Genitourinary: Negative.    Neurological: Negative.        Physical Exam  Vitals reviewed.   Constitutional:       Appearance: Normal appearance.   Cardiovascular:      Rate and Rhythm: Normal rate and regular rhythm.      Heart sounds: Normal heart sounds.      Comments: 120/80  Pulmonary:      Effort: Pulmonary effort is normal.      Breath sounds: Normal breath sounds.   Musculoskeletal:      Right lower leg: No edema.      Left lower leg: No edema.   Neurological:      Mental Status: She is alert.   Psychiatric:         Mood and Affect: Mood normal.         Behavior: Behavior normal.         Thought Content: Thought content normal.         Judgment: Judgment normal.         ASSESSMENT blood pressure is well controlled.  Kidney functions are stable.  Lipids are normal.  TSH is normal.  Hemoglobin is 11.9 and platelets are 121.  Continue healthy heart diet and regular exercise.  We will continue current medicines.  Will order a bone density test.     Problems Addressed this Visit        Cardiac and Vasculature    Hyperlipidemia    Primary hypertension    Relevant Medications    propranolol (INDERAL) 80 MG tablet       Coag and Thromboembolic    Thrombocytopenia (HCC)       Endocrine and Metabolic    Hypothyroidism - Primary    Relevant Medications    propranolol (INDERAL) 80 MG tablet       Genitourinary and Reproductive     Stage 3a chronic kidney disease (HCC)      Other Visit Diagnoses     Postmenopause        Relevant Orders    DEXA Bone Density Axial      Diagnoses       Codes Comments    Acquired hypothyroidism    -  Primary ICD-10-CM:  E03.9  ICD-9-CM: 244.9     Hyperlipidemia, unspecified hyperlipidemia type     ICD-10-CM: E78.5  ICD-9-CM: 272.4     Stage 3a chronic kidney disease (HCC)     ICD-10-CM: N18.31  ICD-9-CM: 585.3     Postmenopause     ICD-10-CM: Z78.0  ICD-9-CM: V49.81     Thrombocytopenia (HCC)     ICD-10-CM: D69.6  ICD-9-CM: 287.5     Primary hypertension     ICD-10-CM: I10  ICD-9-CM: 401.9           PLAN  There are no Patient Instructions on file for this visit.    Return in about 6 months (around 3/13/2023), or Wellness visit, for labs.  Answers for HPI/ROS submitted by the patient on 9/10/2022  Please describe your symptoms.: na  Have you had these symptoms before?: No  How long have you been having these symptoms?: 1-4 days  Please list any medications you are currently taking for this condition.: na  Please describe any probable cause for these symptoms. : na  What is the primary reason for your visit?: Other

## 2023-01-05 ENCOUNTER — HOSPITAL ENCOUNTER (OUTPATIENT)
Dept: BONE DENSITY | Facility: HOSPITAL | Age: 73
End: 2023-01-05
Payer: MEDICARE

## 2023-01-10 ENCOUNTER — HOSPITAL ENCOUNTER (OUTPATIENT)
Dept: CARDIOLOGY | Facility: HOSPITAL | Age: 73
Discharge: HOME | End: 2023-01-10
Admitting: INTERNAL MEDICINE

## 2023-01-10 VITALS
HEIGHT: 63 IN | SYSTOLIC BLOOD PRESSURE: 159 MMHG | BODY MASS INDEX: 23.92 KG/M2 | HEART RATE: 68 BPM | WEIGHT: 135 LBS | DIASTOLIC BLOOD PRESSURE: 81 MMHG

## 2023-01-10 DIAGNOSIS — Z13.6 ENCOUNTER FOR SCREENING FOR VASCULAR DISEASE: ICD-10-CM

## 2023-01-10 LAB
BH CV ECHO MEAS - DIST AO DIAM: 1.43 CM
BH CV VAS BP LEFT ARM: NORMAL MMHG
BH CV VAS BP RIGHT ARM: NORMAL MMHG
BH CV XLRA MEAS - MID AO DIAM: 1.68 CM
BH CV XLRA MEAS - PAD LEFT ABI DP: 1.08
BH CV XLRA MEAS - PAD LEFT ABI PT: 1.13
BH CV XLRA MEAS - PAD LEFT ARM: 159 MMHG
BH CV XLRA MEAS - PAD LEFT LEG DP: 171 MMHG
BH CV XLRA MEAS - PAD LEFT LEG PT: 179 MMHG
BH CV XLRA MEAS - PAD RIGHT ABI DP: 1.12
BH CV XLRA MEAS - PAD RIGHT ABI PT: 1.13
BH CV XLRA MEAS - PAD RIGHT ARM: 152 MMHG
BH CV XLRA MEAS - PAD RIGHT LEG DP: 178 MMHG
BH CV XLRA MEAS - PAD RIGHT LEG PT: 180 MMHG
BH CV XLRA MEAS - PROX AO DIAM: 1.98 CM
BH CV XLRA MEAS LEFT ICA/CCA RATIO: 1.33
BH CV XLRA MEAS LEFT MID CCA PSV: NORMAL CM/SEC
BH CV XLRA MEAS LEFT MID ICA PSV: NORMAL CM/SEC
BH CV XLRA MEAS LEFT PROX ECA PSV: NORMAL CM/SEC
BH CV XLRA MEAS RIGHT ICA/CCA RATIO: 1.31
BH CV XLRA MEAS RIGHT MID CCA PSV: NORMAL CM/SEC
BH CV XLRA MEAS RIGHT MID ICA PSV: NORMAL CM/SEC
BH CV XLRA MEAS RIGHT PROX ECA PSV: NORMAL CM/SEC
MAXIMAL PREDICTED HEART RATE: 145 BPM
STRESS TARGET HR: 123 BPM

## 2023-01-10 PROCEDURE — 93799 UNLISTED CV SVC/PROCEDURE: CPT

## 2023-01-13 ENCOUNTER — HOSPITAL ENCOUNTER (OUTPATIENT)
Dept: BONE DENSITY | Facility: HOSPITAL | Age: 73
Discharge: HOME | End: 2023-01-13
Admitting: INTERNAL MEDICINE
Payer: MEDICARE

## 2023-01-13 PROCEDURE — 77080 DXA BONE DENSITY AXIAL: CPT

## 2023-03-23 ENCOUNTER — OFFICE VISIT (OUTPATIENT)
Dept: FAMILY MEDICINE CLINIC | Facility: CLINIC | Age: 73
End: 2023-03-23
Payer: MEDICARE

## 2023-03-23 VITALS
OXYGEN SATURATION: 97 % | DIASTOLIC BLOOD PRESSURE: 76 MMHG | WEIGHT: 138 LBS | BODY MASS INDEX: 24.45 KG/M2 | SYSTOLIC BLOOD PRESSURE: 128 MMHG | RESPIRATION RATE: 16 BRPM | HEIGHT: 63 IN | TEMPERATURE: 96.9 F | HEART RATE: 71 BPM

## 2023-03-23 DIAGNOSIS — Z23 NEED FOR PNEUMOCOCCAL VACCINATION: ICD-10-CM

## 2023-03-23 DIAGNOSIS — E03.9 ACQUIRED HYPOTHYROIDISM: ICD-10-CM

## 2023-03-23 DIAGNOSIS — D72.820 LYMPHOCYTOSIS: ICD-10-CM

## 2023-03-23 DIAGNOSIS — N18.31 STAGE 3A CHRONIC KIDNEY DISEASE: ICD-10-CM

## 2023-03-23 DIAGNOSIS — E78.5 HYPERLIPIDEMIA, UNSPECIFIED HYPERLIPIDEMIA TYPE: ICD-10-CM

## 2023-03-23 DIAGNOSIS — I10 PRIMARY HYPERTENSION: Primary | ICD-10-CM

## 2023-03-23 DIAGNOSIS — D64.89 ANEMIA DUE TO OTHER CAUSE, NOT CLASSIFIED: ICD-10-CM

## 2023-03-23 DIAGNOSIS — D69.6 THROMBOCYTOPENIA: ICD-10-CM

## 2023-03-23 RX ORDER — ALENDRONATE SODIUM 70 MG/1
70 TABLET ORAL
Qty: 12 TABLET | Refills: 3 | Status: SHIPPED | OUTPATIENT
Start: 2023-03-23

## 2023-03-23 NOTE — PROGRESS NOTES
The ABCs of the Annual Wellness Visit  Subsequent Medicare Wellness Visit    Subjective    Rosalina Ferrell is a 73 y.o. female who presents for a Subsequent Medicare Wellness Visit.    The following portions of the patient's history were reviewed and   updated as appropriate: allergies, current medications, past family history, past medical history, past social history, past surgical history and problem list.    Compared to one year ago, the patient feels her physical   health is the same.    Compared to one year ago, the patient feels her mental   health is the same.    Recent Hospitalizations:  She was not admitted to the hospital during the last year.       Current Medical Providers:  Patient Care Team:  Kavon Wheat MD as PCP - General (Internal Medicine)    Outpatient Medications Prior to Visit   Medication Sig Dispense Refill   • aspirin 81 MG tablet Take 81 mg by mouth Daily.     • calcium carbonate (OS-KARLA) 600 MG tablet Take 600 mg by mouth Daily.     • cholecalciferol (VITAMIN D3) 25 MCG (1000 UT) tablet Take 1,000 Units by mouth Daily.     • divalproex (DEPAKOTE) 250 MG EC tablet Take by mouth.     • levothyroxine (SYNTHROID, LEVOTHROID) 75 MCG tablet TAKE 1 TABLET EVERY DAY 90 tablet 3   • Omega-3 Fatty Acids (FISH OIL) 1000 MG capsule capsule Take  by mouth Daily With Breakfast.     • propranolol (INDERAL) 80 MG tablet      • QUEtiapine (SEROquel) 100 MG tablet      • simvastatin (ZOCOR) 40 MG tablet TAKE 1 TABLET EVERY NIGHT 90 tablet 3     No facility-administered medications prior to visit.       No opioid medication identified on active medication list. I have reviewed chart for other potential  high risk medication/s and harmful drug interactions in the elderly.          Aspirin is on active medication list. Aspirin use is not indicated based on review of current medical condition/s. Risk of harm outweighs potential benefits. Patient instructed to discontinue this medication.  .      Patient  "Active Problem List   Diagnosis   • Vitamin D deficiency   • Urinary incontinence   • Compression fracture of vertebral column ([facility])   • Hypothyroidism   • Osteopenia   • Hyperlipidemia   • Hypertonicity of bladder   • Bipolar I disorder, single manic episode ([facility])   • Generalized abdominal pain   • Gastroesophageal reflux disease without esophagitis   • Tremor   • Constipation   • MCI (mild cognitive impairment)   • Medication-induced postural tremor   • Hallucinations   • Hair loss   • Thrombocytopenia ([facility])   • Borderline glaucoma   • Glaucomatous atrophy of optic disc   • Low-tension glaucoma of right eye   • Meibomian gland dysfunction of right eye   • Varicose veins of both lower extremities   • Stage 3a chronic kidney disease ([facility])   • Primary hypertension   • Other specified anemias   • Lymphocytosis     Advance Care Planning  Advance Directive is on file.  ACP discussion was held with the patient during this visit. Patient has an advance directive in EMR which is still valid.      Objective    Vitals:    03/23/23 1312   BP: 128/76   Pulse: 71   Resp: 16   Temp: 96.9 °F (36.1 °C)   SpO2: 97%   Weight: 62.6 kg (138 lb)   Height: 158.8 cm (62.52\")     Estimated body mass index is 24.82 kg/m² as calculated from the following:    Height as of this encounter: 158.8 cm (62.52\").    Weight as of this encounter: 62.6 kg (138 lb).    BMI is within normal parameters. No other follow-up for BMI required.      Does the patient have evidence of cognitive impairment? No    Lab Results   Component Value Date    CHLPL 164 03/21/2023    TRIG 81 03/21/2023    HDL 53 03/21/2023    LDL 96 03/21/2023    VLDL 15 03/21/2023        HEALTH RISK ASSESSMENT    Smoking Status:  Social History     Tobacco Use   Smoking Status Never   • Passive exposure: Never   Smokeless Tobacco Never     Alcohol Consumption:  Social History     Substance and Sexual Activity   Alcohol Use No     Fall Risk Screen:    PAGE Fall Risk Assessment was " completed, and patient is at MODERATE risk for falls. Assessment completed on:3/23/2023    Depression Screening:  PHQ-2/PHQ-9 Depression Screening 3/23/2023   Little Interest or Pleasure in Doing Things 0-->not at all   Feeling Down, Depressed or Hopeless 1-->several days   PHQ-9: Brief Depression Severity Measure Score 1       Health Habits and Functional and Cognitive Screening:  Functional & Cognitive Status 3/23/2023   Do you have difficulty preparing food and eating? No   Do you have difficulty bathing yourself, getting dressed or grooming yourself? No   Do you have difficulty using the toilet? No   Do you have difficulty moving around from place to place? No   Do you have trouble with steps or getting out of a bed or a chair? Yes   Current Diet Well Balanced Diet   Dental Exam Up to date   Eye Exam Up to date   Exercise (times per week) 3 times per week        Exercise Frequency Comment -   Current Exercises Include Home Fitness Gym;Walking   Current Exercise Activities Include -   Do you need help using the phone?  Yes   Are you deaf or do you have serious difficulty hearing?  Yes   Do you need help with transportation? Yes   Do you need help shopping? No   Do you need help preparing meals?  No   Do you need help with housework?  No   Do you need help with laundry? No   Do you need help taking your medications? No   Do you need help managing money? Yes   Do you ever drive or ride in a car without wearing a seat belt? No   Have you felt unusual stress, anger or loneliness in the last month? Yes   Who do you live with? Spouse   If you need help, do you have trouble finding someone available to you? No   Have you been bothered in the last four weeks by sexual problems? No   Do you have difficulty concentrating, remembering or making decisions? Yes       Age-appropriate Screening Schedule:  Refer to the list below for future screening recommendations based on patient's age, sex and/or medical conditions. Orders  for these recommended tests are listed in the plan section. The patient has been provided with a written plan.    Health Maintenance   Topic Date Due   • Hepatitis B (1 of 3 - Risk 3-dose series) Never done   • ANNUAL WELLNESS VISIT  03/04/2023   • COLORECTAL CANCER SCREENING  03/18/2024   • LIPID PANEL  03/21/2024   • MAMMOGRAM  05/09/2024   • DXA SCAN  01/13/2025   • TDAP/TD VACCINES (2 - Td or Tdap) 03/17/2032   • HEPATITIS C SCREENING  Completed   • COVID-19 Vaccine  Completed   • INFLUENZA VACCINE  Completed   • Pneumococcal Vaccine 65+  Completed   • ZOSTER VACCINE  Completed                CMS Preventative Services Quick Reference  Risk Factors Identified During Encounter  Immunizations Discussed/Encouraged: Tdap, Hepatitis A Vaccine/Series, Influenza, Pneumococcal 23, Prevnar 20 (Pneumococcal 20-valent conjugate), Shingrix and COVID19  The above risks/problems have been discussed with the patient.  Pertinent information has been shared with the patient in the After Visit Summary.  An After Visit Summary and PPPS were made available to the patient.    Follow Up:   Next Medicare Wellness visit to be scheduled in 1 year.       Additional E&M Note during same encounter follows:  Patient has multiple medical problems which are significant and separately identifiable that require additional work above and beyond the Medicare Wellness Visit.      Chief Complaint  Medicare Wellness-subsequent and Hyperlipidemia    Subjective        HPI  Rosalina Ferrell is also being seen today for a blood pressure check and lab follow-up.  She has hypothyroidism, chronic kidney disease, hyperlipidemia, thrombocytopenia, osteopenia, bipolar disorder.  She feels well.  She eats healthy and is physically active.  She is followed by psychiatry every 3 months.  She had vascular screening test recently had some carotid artery plaque without stenosis.  Recent DEXA scan showed hip osteopenia.  She does take calcium plus D supplement.      "    Objective   Vital Signs:  /76   Pulse 71   Temp 96.9 °F (36.1 °C)   Resp 16   Ht 158.8 cm (62.52\")   Wt 62.6 kg (138 lb)   SpO2 97%   BMI 24.82 kg/m²     Physical Exam  Vitals reviewed.   Constitutional:       Appearance: Normal appearance.   Neck:      Vascular: No carotid bruit.   Cardiovascular:      Rate and Rhythm: Normal rate and regular rhythm.      Heart sounds: Normal heart sounds.      Comments: 122/70  Pulmonary:      Effort: Pulmonary effort is normal.      Breath sounds: Normal breath sounds.   Musculoskeletal:      Right lower leg: No edema.      Left lower leg: No edema.   Neurological:      Mental Status: She is alert.   Psychiatric:         Mood and Affect: Mood normal.         Behavior: Behavior normal.         Thought Content: Thought content normal.         Judgment: Judgment normal.                         Assessment and Plan   Diagnoses and all orders for this visit:    1. Primary hypertension (Primary)    2. Hyperlipidemia, unspecified hyperlipidemia type    3. Thrombocytopenia (HCC)  -     Ambulatory Referral to Hematology / Oncology    4. Acquired hypothyroidism    5. Stage 3a chronic kidney disease (HCC)    6. Anemia due to other cause, not classified  -     Ambulatory Referral to Hematology / Oncology    7. Lymphocytosis  -     Ambulatory Referral to Hematology / Oncology    Blood pressure is stable.  Hemoglobin is mildly decreased at 11.5.  Platelet count remains mildly decreased.  Differential shows lymphocytosis.  Thyroid-stimulating hormone level is normal.  Lipids are normal.  Kidney functions are stable.  Discussed results of bone density test as well as recent vascular screening test.  We will start Fosamax 70 mg weekly.  Continue calcium and D supplementation.  Will refer to hematology for appropriate evaluation.  We will give a Prevnar 20 today.         Follow Up   Return in about 6 months (around 9/23/2023) for tsh, lipid, cmp.  Patient was given instructions and " counseling regarding her condition or for health maintenance advice. Please see specific information pulled into the AVS if appropriate.

## 2023-03-24 ENCOUNTER — TELEPHONE (OUTPATIENT)
Dept: FAMILY MEDICINE CLINIC | Facility: CLINIC | Age: 73
End: 2023-03-24

## 2023-03-30 ENCOUNTER — TRANSCRIBE ORDERS (OUTPATIENT)
Dept: ADMINISTRATIVE | Facility: HOSPITAL | Age: 73
End: 2023-03-30
Payer: MEDICARE

## 2023-03-30 DIAGNOSIS — Z12.31 SCREENING MAMMOGRAM FOR BREAST CANCER: Primary | ICD-10-CM

## 2023-03-31 NOTE — TELEPHONE ENCOUNTER
Caller: MariaelenaCharlee siddiqui    Relationship: Emergency Contact    Best call back number: [phone]    What is the best time to reach you: ANY    Who are you requesting to speak with (clinical staff, provider,  specific staff member):  CLINICAL STAFF    Do you know the name of the person who called: CHARLEE     What was the call regarding: PATIENT  CHARLEE STATED SHE WAS SEEN YESTERDAY BY DR ROBERSON AND HE HAS QUESTION ABOUT HER MEDICATION ASPIRIN 81 MG.  HER NOTES STATES FOR HER NOT TO TAKE IT AND SHE THINKS HE TOLD HER TO CONTINUE TAKING IT AND HE NEEDS CLARIFICATION.  THERE IS NEW MEDICATION HE PRESCRIBED FOR ASTEOPORIS FOSMAX THAT HE WOULD LIKE TO TALK WITH THE DR ROBERSON ABOUT.      Do you require a callback: YES        
Called pt picket up the phone. It was very hard to hear. Will call back in the morning   
Spoke with pt. Informed him that the fosamax was prescribed for osteopenia per Dr. Wheat    
not applicable

## 2023-04-19 ENCOUNTER — HOSPITAL ENCOUNTER (OUTPATIENT)
Dept: GENERAL RADIOLOGY | Facility: HOSPITAL | Age: 73
Discharge: HOME | End: 2023-04-19
Payer: MEDICARE

## 2023-04-19 ENCOUNTER — OFFICE VISIT (OUTPATIENT)
Dept: FAMILY MEDICINE CLINIC | Facility: CLINIC | Age: 73
End: 2023-04-19
Payer: MEDICARE

## 2023-04-19 VITALS
DIASTOLIC BLOOD PRESSURE: 80 MMHG | RESPIRATION RATE: 15 BRPM | TEMPERATURE: 97.1 F | SYSTOLIC BLOOD PRESSURE: 122 MMHG | HEART RATE: 60 BPM | BODY MASS INDEX: 23.92 KG/M2 | OXYGEN SATURATION: 98 % | HEIGHT: 63 IN | WEIGHT: 135 LBS

## 2023-04-19 DIAGNOSIS — M89.8X1 PAIN OF RIGHT SCAPULA: ICD-10-CM

## 2023-04-19 DIAGNOSIS — S40.011A CONTUSION OF RIGHT SHOULDER, INITIAL ENCOUNTER: ICD-10-CM
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
## 2023-04-19 DIAGNOSIS — M89.8X1 PAIN OF RIGHT CLAVICLE: ICD-10-CM

## 2023-04-19 DIAGNOSIS — S22.31XA CLOSED FRACTURE OF ONE RIB OF RIGHT SIDE, INITIAL ENCOUNTER: ICD-10-CM

## 2023-04-19 DIAGNOSIS — S52.501A CLOSED FRACTURE OF DISTAL END OF RIGHT RADIUS, UNSPECIFIED FRACTURE MORPHOLOGY, INITIAL ENCOUNTER: Primary | ICD-10-CM

## 2023-04-19 DIAGNOSIS — M25.511 ACUTE PAIN OF RIGHT SHOULDER: ICD-10-CM

## 2023-04-19 PROCEDURE — 71101 X-RAY EXAM UNILAT RIBS/CHEST: CPT

## 2023-04-19 PROCEDURE — 73030 X-RAY EXAM OF SHOULDER: CPT

## 2023-04-19 NOTE — PROGRESS NOTES
Subjective  Answers for HPI/ROS submitted by the patient on 4/19/2023  Please describe your symptoms.: Brused shoulder after fall from bed  Have you had these symptoms before?: No  How long have you been having these symptoms?: 1-4 days  Please list any medications you are currently taking for this condition.: NA  What is the primary reason for your visit?: Steven Ferrell is a 73 y.o.. female.     Patient here today with her  with c/o fall on 4/17. Patient was in her bedroom, stated she felt nauseous and upon trying to get out of bed, fell forward and hit head and right shoulder on floor. Patient did not loose consciousness. Patient did not go anywhere to be seen until now. Patient denies having any headaches, nausea (since fal), blurred vision and/or double vision. Patient's  has not noticed any changes in patient's mental status. Patient is having issues with moving right arm/sholder. Patient stating she can not get right arm above head and has been using right arm less due to pain.       The following portions of the patient's history were reviewed and updated as appropriate: allergies, current medications, past family history, past medical history, past social history, past surgical history and problem list.    Past Medical History:   Diagnosis Date   • Bipolar 1 disorder, manic, mild    • Cataract    • Depression    • Hyperlipidemia    • Hypothyroidism    • Low back pain    • Tremor    • Urinary incontinence        Past Surgical History:   Procedure Laterality Date   • BREAST BIOPSY     • CHOLECYSTECTOMY     • COLONOSCOPY     • HYSTERECTOMY     • MOUTH SURGERY      Skin graft   • OOPHORECTOMY         Review of Systems   Constitutional: Negative.    Eyes: Negative.  Negative for visual disturbance.   Respiratory: Negative.    Cardiovascular: Negative.    Gastrointestinal: Negative.  Negative for nausea.   Genitourinary: Negative.    Musculoskeletal: Positive for arthralgias (right  "shoulder).   Neurological: Negative for dizziness, weakness and headaches.   Psychiatric/Behavioral:        No change in mental status       No Known Allergies    Objective     Vitals:    04/19/23 1538   BP: 122/80   BP Location: Left arm   Patient Position: Sitting   Cuff Size: Adult   Pulse: 60   Resp: 15   Temp: 97.1 °F (36.2 °C)   TempSrc: Temporal   SpO2: 98%   Weight: 61.2 kg (135 lb)   Height: 158.8 cm (62.52\")     Body mass index is 24.28 kg/m².    Physical Exam  Vitals reviewed.   HENT:      Head: Normocephalic.   Eyes:      Pupils: Pupils are equal, round, and reactive to light.   Cardiovascular:      Rate and Rhythm: Normal rate and regular rhythm.   Pulmonary:      Effort: Pulmonary effort is normal.      Breath sounds: Normal breath sounds.   Chest:   Breasts:     Right: Tenderness (to clavicle and right upper chest) present. No swelling.   Musculoskeletal:      Right shoulder: Tenderness and bony tenderness present. No swelling or laceration. Decreased range of motion. Decreased strength.   Skin:     Findings: Bruising (to right shoulder, right upper chest, right scapula) present.   Neurological:      Mental Status: She is alert.   Psychiatric:         Behavior: Behavior normal.           Current Outpatient Medications:   •  alendronate (Fosamax) 70 MG tablet, Take 1 tablet by mouth Every 7 (Seven) Days., Disp: 12 tablet, Rfl: 3  •  aspirin 81 MG tablet, Take 1 tablet by mouth Daily., Disp: , Rfl:   •  calcium carbonate (OS-KARLA) 600 MG tablet, Take 1 tablet by mouth Daily., Disp: , Rfl:   •  cholecalciferol (VITAMIN D3) 25 MCG (1000 UT) tablet, Take 1 tablet by mouth Daily., Disp: , Rfl:   •  divalproex (DEPAKOTE) 250 MG EC tablet, Take by mouth., Disp: , Rfl:   •  levothyroxine (SYNTHROID, LEVOTHROID) 75 MCG tablet, TAKE 1 TABLET EVERY DAY, Disp: 90 tablet, Rfl: 3  •  Omega-3 Fatty Acids (FISH OIL) 1000 MG capsule capsule, Take  by mouth Daily With Breakfast., Disp: , Rfl:   •  propranolol (INDERAL) " 80 MG tablet, , Disp: , Rfl:   •  QUEtiapine (SEROquel) 100 MG tablet, , Disp: , Rfl:   •  simvastatin (ZOCOR) 40 MG tablet, TAKE 1 TABLET EVERY NIGHT, Disp: 90 tablet, Rfl: 3        Diagnoses and all orders for this visit:    1. Closed fracture of distal end of right radius, unspecified fracture morphology, initial encounter (Primary)  Comments:  fracture of distal radius with widening of a/c joint  Orders:  -     Ambulatory Referral to Orthopedic Surgery    2. Closed fracture of one rib of right side, initial encounter  Comments:  right lateral 9th rib fracture    3. Fall, initial encounter  -     XR Shoulder 2+ View Right; Future  -     XR Ribs Right With PA Chest; Future    4. Contusion of right shoulder, initial encounter    5. Acute pain of right shoulder  -     XR Shoulder 2+ View Right; Future    6. Pain of right clavicle  -     XR Ribs Right With PA Chest; Future    7. Pain of right scapula  -     XR Ribs Right With PA Chest; Future        Patient Instructions   May use cold compress/ice pack 10-15 minutes at a time several times a day   May use warm compress/heating pad 10-15 minutes at at time several times a day  May use tylenol prn for pain  Awaiting xrays, ortho referral placed due to radius fracture; Patient's  informed to have Patient start using right arm sling on while awake, to not use right arm until seen by ortho., and if worsening symptoms to go to ER.       Return if symptoms worsen or fail to improve, for Dr. Wheat as needed/as recommended.

## 2023-04-19 NOTE — PATIENT INSTRUCTIONS
May use cold compress/ice pack 10-15 minutes at a time several times a day   May use warm compress/heating pad 10-15 minutes at at time several times a day  May use tylenol prn for pain  Awaiting xrays, ortho referral placed due to radius fracture; Patient's  informed to have Patient start using right arm sling on while awake, to not use right arm until seen by ortho., and if worsening symptoms to go to ER.

## 2023-04-21 ENCOUNTER — TELEPHONE (OUTPATIENT)
Dept: FAMILY MEDICINE CLINIC | Facility: CLINIC | Age: 73
End: 2023-04-21
Payer: MEDICARE

## 2023-04-21 NOTE — TELEPHONE ENCOUNTER
Caller: Lorenzo Ferrell    Relationship: Emergency Contact    Best call back number: [phone]     Caller requesting test results: YES    What test was performed: XRAY    When was the test performed: 04/19/23    Additional notes: PATIENT VIEWED RESULTS VIA [facility], BUT WOULD LIKE CLINICAL EXPLANATION.

## 2023-04-21 NOTE — TELEPHONE ENCOUNTER
Called and spoke with Patient's  regarding imaging results, need for ortho referral, arm sling and recommendations on care. Patient's  verb. Understanding.

## 2023-04-25 ENCOUNTER — OFFICE VISIT (OUTPATIENT)
Dept: ORTHOPEDIC SURGERY | Facility: CLINIC | Age: 73
End: 2023-04-25
Payer: MEDICARE

## 2023-04-25 VITALS — TEMPERATURE: 96.2 F | HEIGHT: 62 IN | WEIGHT: 135.3 LBS | BODY MASS INDEX: 24.9 KG/M2

## 2023-04-25 DIAGNOSIS — M89.8X1 PAIN OF RIGHT CLAVICLE: ICD-10-CM

## 2023-04-25 DIAGNOSIS — S42.031A CLOSED DISPLACED FRACTURE OF ACROMIAL END OF RIGHT CLAVICLE, INITIAL ENCOUNTER: Primary | ICD-10-CM

## 2023-04-25 NOTE — PROGRESS NOTES
General Exam    Patient: Rosalina Ferrell    YOB: 1950    Medical Record Number: [id number]    Chief Complaints: Right shoulder pain    History of Present Illness:     73 y.o. female patient who presents for evaluation treatment of right shoulder pain.  Patient said she had a fall 1 week ago and was told there is abnormality on the x-ray.  She was diagnosed with clavicle fracture and given a sling.  States pain is better with using the sling.    Denies any numbness or tingling.  Denies any fevers, cough or shortness of breath.    Allergies: No Known Allergies    Home Medications:      Current Outpatient Medications:   •  alendronate (Fosamax) 70 MG tablet, Take 1 tablet by mouth Every 7 (Seven) Days., Disp: 12 tablet, Rfl: 3  •  aspirin 81 MG tablet, Take 1 tablet by mouth Daily., Disp: , Rfl:   •  calcium carbonate (OS-KARLA) 600 MG tablet, Take 1 tablet by mouth Daily., Disp: , Rfl:   •  cholecalciferol (VITAMIN D3) 25 MCG (1000 UT) tablet, Take 1 tablet by mouth Daily., Disp: , Rfl:   •  divalproex (DEPAKOTE) 250 MG EC tablet, Take by mouth., Disp: , Rfl:   •  levothyroxine (SYNTHROID, LEVOTHROID) 75 MCG tablet, TAKE 1 TABLET EVERY DAY, Disp: 90 tablet, Rfl: 3  •  Omega-3 Fatty Acids (FISH OIL) 1000 MG capsule capsule, Take  by mouth Daily With Breakfast., Disp: , Rfl:   •  propranolol (INDERAL) 80 MG tablet, , Disp: , Rfl:   •  QUEtiapine (SEROquel) 100 MG tablet, , Disp: , Rfl:   •  simvastatin (ZOCOR) 40 MG tablet, TAKE 1 TABLET EVERY NIGHT, Disp: 90 tablet, Rfl: 3    Past Medical History:   Diagnosis Date   • Bipolar 1 disorder, manic, mild    • Cataract    • Depression    • Hyperlipidemia    • Hypothyroidism    • Low back pain    • Tremor    • Urinary incontinence        Past Surgical History:   Procedure Laterality Date   • BREAST BIOPSY     • CHOLECYSTECTOMY     • COLONOSCOPY     • HYSTERECTOMY     • MOUTH SURGERY      Skin graft   • OOPHORECTOMY         Social History     Occupational History      "Employer: RETIRED   Tobacco Use   • Smoking status: Never     Passive exposure: Never   • Smokeless tobacco: Never   Vaping Use   • Vaping Use: Never used   Substance and Sexual Activity   • Alcohol use: No   • Drug use: No   • Sexual activity: Yes     Partners: Male     Birth control/protection: None      Social History     Social History Narrative   • Not on file       Family History   Problem Relation Age of Onset   • Heart attack Mother    • Cancer Mother    • Heart attack Brother    • Heart disease Brother    • Heart disease Brother    • Breast cancer Neg Hx        Review of Systems:      Constitutional: Denies fever, shaking or chills         All other pertinent positives and negatives as noted above in HPI.    Physical Exam: 73 y.o. female    Vitals:    04/25/23 1053   Temp: 96.2 °F (35.7 °C)   Weight: 61.4 kg (135 lb 4.8 oz)   Height: 157.5 cm (62\")       General:  Patient is awake and alert.  Appears in no acute distress or discomfort.      Musculoskeletal/Extremities:    Right upper extremity examined some swelling and ecchymosis over the superior aspect of the shoulder particularly laterally also some tenderness palpation over the lateral aspect of the clavicle.  No skin threatening or tenting noted.  Limited shoulder range of motion as would be expected.  Elbow and wrist range of motion full and painless no tenderness to palpation.         Radiology:       Previous films reviewed to evaluate the patient's complaint/s.    Imaging shows distal clavicle fracture with slight AC joint widening.     No imaging for comparison.    Assessment: Distal clavicle fracture right      Plan:      Recommend conservative treatment this time assisting of rest, ice, to modification, sling and Tylenol for pain.  Patient may remove the arm out of the sling 3 times a day for gentle elbow and wrist range of motion in 1 week she may also begin some shoulder pendulums otherwise continue full-time and sling.  Plan to bring her " back in 2 weeks.  Plan sling for a total of 6 weeks and then wean out and start therapy.           We will plan for follow up as above.    All questions were answered.  Patient understands and agrees with the plan.    Colton Hou MD    04/25/2023    CC to Kavon Wheat MD

## 2023-04-27 ENCOUNTER — PATIENT ROUNDING (BHMG ONLY) (OUTPATIENT)
Dept: ORTHOPEDIC SURGERY | Facility: CLINIC | Age: 73
End: 2023-04-27
Payer: MEDICARE

## 2023-04-27 NOTE — PROGRESS NOTES
A [employer] Message has been sent to the patient for PATIENT ROUNDING with [facility] [place]

## 2023-05-05 ENCOUNTER — OFFICE VISIT (OUTPATIENT)
Dept: ORTHOPEDIC SURGERY | Facility: CLINIC | Age: 73
End: 2023-05-05
Payer: MEDICARE

## 2023-05-05 VITALS — BODY MASS INDEX: 22.53 KG/M2 | TEMPERATURE: 97.5 F | WEIGHT: 132 LBS | HEIGHT: 64 IN

## 2023-05-05 DIAGNOSIS — R52 PAIN: ICD-10-CM

## 2023-05-05 DIAGNOSIS — S42.031D CLOSED DISPLACED FRACTURE OF ACROMIAL END OF RIGHT CLAVICLE WITH ROUTINE HEALING, SUBSEQUENT ENCOUNTER: Primary | ICD-10-CM

## 2023-05-05 NOTE — PROGRESS NOTES
"Shoulder Follow Up      Patient: Rosalina Ferrell        YOB: 1950            Chief Complaints: Shoulder pain      History of Present Illness: Right clavicle fracture follow-up.  She is now 3 weeks out.  States pain, bruising and swelling are improved.  She has been using her sling.  No complaints.      Physical Exam: 73 y.o. female  General Appearance:    Alert, cooperative, in no acute distress                   Vitals:    05/05/23 1028   Temp: 97.5 °F (36.4 °C)   Weight: 59.9 kg (132 lb)   Height: 162.6 cm (64\")   PainSc:   5   PainLoc: Shoulder        Patient is alert and read ×3 no acute distress appears her above-listed at height weight and age.  Affect is normal respiratory rate is normal unlabored. Heart rate regular rate rhythm, sclera, dentition and hearing are normal for the purpose of this exam.      Ortho Exam      Right upper extremity examined swelling improved ecchymosis improved minimal tenderness about the distal clavicle.  Tolerates some gentle motion.  Motor and sensory intact distally.    Radiographs    2 views right clavicle taken reviewed show evidence of distal clavicle fracture overall alignment is unchanged compared to previous films some interval healing with callus formation.        Assessment/Plan:      3 weeks status post right distal clavicle fracture    Continue conservative treatment.  Continue sling at all times but remove 3-5 times a day for gentle wrist, elbow and shoulder pendulums.  Continue vitamin D for bone healing health.  Ice as needed.  Tylenol for pain.  Follow-up in 3 weeks.  Findings are going well plan to start to wean out of sling and start physical therapy.  All questions answered.  Patient and  understand agree with the plan.            "

## 2023-05-15 ENCOUNTER — LAB (OUTPATIENT)
Dept: LAB | Facility: HOSPITAL | Age: 73
End: 2023-05-15
Payer: MEDICARE

## 2023-05-15 ENCOUNTER — CONSULT (OUTPATIENT)
Dept: ONCOLOGY | Facility: CLINIC | Age: 73
End: 2023-05-15
Payer: MEDICARE

## 2023-05-15 VITALS
OXYGEN SATURATION: 97 % | TEMPERATURE: 97.7 F | SYSTOLIC BLOOD PRESSURE: 136 MMHG | DIASTOLIC BLOOD PRESSURE: 82 MMHG | WEIGHT: 135.3 LBS | BODY MASS INDEX: 23.1 KG/M2 | HEART RATE: 57 BPM | HEIGHT: 64 IN | RESPIRATION RATE: 18 BRPM

## 2023-05-15 DIAGNOSIS — D75.89 MACROCYTOSIS: ICD-10-CM

## 2023-05-15 DIAGNOSIS — D69.6 THROMBOCYTOPENIA: ICD-10-CM

## 2023-05-15 DIAGNOSIS — D64.9 ANEMIA, UNSPECIFIED TYPE: Primary | ICD-10-CM

## 2023-05-15 DIAGNOSIS — D69.6 THROMBOCYTOPENIA: Primary | ICD-10-CM

## 2023-05-15 LAB
BASOPHILS # BLD AUTO: 0.02 10*3/MM3 (ref 0–0.2)
BASOPHILS NFR BLD AUTO: 0.4 % (ref 0–1.5)
DEPRECATED RDW RBC AUTO: 49.5 FL (ref 37–54)
EOSINOPHIL # BLD AUTO: 0.04 10*3/MM3 (ref 0–0.4)
EOSINOPHIL NFR BLD AUTO: 0.7 % (ref 0.3–6.2)
ERYTHROCYTE [DISTWIDTH] IN BLOOD BY AUTOMATED COUNT: 12.6 % (ref 12.3–15.4)
FOLATE SERPL-MCNC: 15.8 NG/ML (ref 4.78–24.2)
HCT VFR BLD AUTO: 40.9 % (ref 34–46.6)
HGB BLD-MCNC: 12.4 G/DL (ref 12–15.9)
HGB RETIC QN AUTO: 37.8 PG (ref 29.8–36.1)
IMM GRANULOCYTES # BLD AUTO: 0.02 10*3/MM3 (ref 0–0.05)
IMM GRANULOCYTES NFR BLD AUTO: 0.4 % (ref 0–0.5)
IMM RETICS NFR: 15.5 % (ref 3–15.8)
LYMPHOCYTES # BLD AUTO: 1.76 10*3/MM3 (ref 0.7–3.1)
LYMPHOCYTES NFR BLD AUTO: 32.9 % (ref 19.6–45.3)
MCH RBC QN AUTO: 32.5 PG (ref 26.6–33)
MCHC RBC AUTO-ENTMCNC: 30.3 G/DL (ref 31.5–35.7)
MCV RBC AUTO: 107.1 FL (ref 79–97)
MONOCYTES # BLD AUTO: 0.5 10*3/MM3 (ref 0.1–0.9)
MONOCYTES NFR BLD AUTO: 9.3 % (ref 5–12)
NEUTROPHILS NFR BLD AUTO: 3.01 10*3/MM3 (ref 1.7–7)
NEUTROPHILS NFR BLD AUTO: 56.3 % (ref 42.7–76)
NRBC BLD AUTO-RTO: 0 /100 WBC (ref 0–0.2)
PLATELET # BLD AUTO: 103 10*3/MM3 (ref 140–450)
PLATELETS.RETICULATED NFR BLD AUTO: 8.7 % (ref 0.9–6.5)
PMV BLD AUTO: 10.8 FL (ref 6–12)
RBC # BLD AUTO: 3.82 10*6/MM3 (ref 3.77–5.28)
RETICS # AUTO: 0.06 10*6/MM3 (ref 0.02–0.13)
RETICS/RBC NFR AUTO: 1.49 % (ref 0.7–1.9)
VIT B12 BLD-MCNC: 476 PG/ML (ref 211–946)
WBC NRBC COR # BLD: 5.35 10*3/MM3 (ref 3.4–10.8)

## 2023-05-15 PROCEDURE — 36415 COLL VENOUS BLD VENIPUNCTURE: CPT

## 2023-05-15 PROCEDURE — 85025 COMPLETE CBC W/AUTO DIFF WBC: CPT

## 2023-05-15 PROCEDURE — 82746 ASSAY OF FOLIC ACID SERUM: CPT | Performed by: INTERNAL MEDICINE

## 2023-05-15 PROCEDURE — 82607 VITAMIN B-12: CPT | Performed by: INTERNAL MEDICINE

## 2023-05-15 PROCEDURE — 85046 RETICYTE/HGB CONCENTRATE: CPT | Performed by: INTERNAL MEDICINE

## 2023-05-15 PROCEDURE — 85055 RETICULATED PLATELET ASSAY: CPT | Performed by: INTERNAL MEDICINE

## 2023-05-15 NOTE — LETTER
May 15, 2023     Kavon Wheat MD  [address] [facility] [address]    Patient: Rosalina Ferrell   YOB: 1950   Date of Visit: 5/15/2023       Dear Dr. Mary Alice MD:    Thank you for referring Rosalina Ferrell to me for evaluation. Below are the relevant portions of my assessment and plan of care.    If you have questions, please do not hesitate to call me. I look forward to following Rosalina along with you.         Sincerely,        Deyvi Roper MD        CC: No Recipients    Deyvi Roper MD  05/15/23 1621  Signed  REFERRING PROVIDER:    Kavon Wheat MD  [address] [id number]    REASON FOR CONSULTATION:    Macrocytic anemia  Thrombocytopenia    HISTORY OF PRESENT ILLNESS:  Rosalina Ferrell is a 73 y.o. female who is referred today for further evaluation of macrocytic anemia and thrombocytopenia.    Platelets were normal through 11/13/2019 with a platelet count of 151,000 at that time.    Hemoglobin was normal through 2/7/2022 with a hemoglobin of 12.2 at that time.    Labs on 3/21/2023 showed a hemoglobin of 11.5, hematocrit 34.7%, MCV 98.3, platelets 123,000.    She recently fell and fractured her right clavicle.  She states that she had quite a bit of bruising after this fall but that this is improving.  She denies any history of blood disorders.  She is on Seroquel at this point which is a relatively new medication for her.  She has been on Depakote for a long time.  She previously was on lithium but has been off of this for at least a year as it was causing kidney injury.        Past Medical History:   Diagnosis Date   • Bipolar 1 disorder, manic, mild    • Cataract    • Depression    • Fracture, clavicle 4/23/23   • Fracture, fibula 4/23/23   • Hyperlipidemia    • Hypothyroidism    • Low back pain    • Tremor    • Urinary incontinence        Past Surgical History:   Procedure Laterality Date   • BREAST BIOPSY     • CHOLECYSTECTOMY     • COLONOSCOPY     • HYSTERECTOMY     • MOUTH SURGERY    "   Skin graft   • OOPHORECTOMY         SOCIAL HISTORY:   reports that she has never smoked. She has never been exposed to tobacco smoke. She has never used smokeless tobacco. She reports that she does not drink alcohol and does not use drugs.    FAMILY HISTORY:  family history includes Cancer in her mother; Heart attack in her brother and mother; Heart disease in her brother and brother.    ALLERGIES:  No Known Allergies    MEDICATIONS:  The medication list has been reviewed with the patient by the medical assistant, and the list has been updated in the electronic medical record, which I reviewed.  Medication dosages and frequencies were confirmed to be accurate.    Review of Systems   Musculoskeletal:        Pain from recent clavicle fracture   All other systems reviewed and are negative.      PHYSICAL EXAMINATION:  Vitals:    05/15/23 1518   BP: 136/82   Pulse: 57   Resp: 18   Temp: 97.7 °F (36.5 °C)   TempSrc: Temporal   SpO2: 97%   Weight: 61.4 kg (135 lb 4.8 oz)   Height: 162.9 cm (64.13\")   PainSc: 0-No pain     General:  No acute distress, awake, alert and oriented  Skin:  Warm and dry, no visible rash  HEENT:  Normocephalic/atraumatic.   Chest:  Normal respiratory effort  Extremities:  No visible clubbing, cyanosis, or edema.  The right upper extremity is in a sling.  Neuro/psych:  Grossly nonfocal.  Normal mood and affect.    DIAGNOSTIC DATA:  CBC & Differential (05/15/2023 14:29)      IMAGING:    None reviewed    ASSESSMENT:  This is a 73 y.o. female with:    *Mild macrocytic anemia  · Hemoglobin was normal through 2/7/2022 with a hemoglobin of 12.2 at that time.  • 5/15/2023: Hemoglobin is actually normal today despite of recent clavicle fracture and bruising  • The MCV is elevated at 107    *Thrombocytopenia  · Platelets were normal through 11/13/2019 with a platelet count of 151,000 at that time.  · 5/15/2023: Platelets 103,000, down from 123,000  · Consumption of platelets due to recent clavicle " fracture and significant bruising after this may be contributing.  I also believe that medications such as Depakote are contributing as well.    *Recent clavicle fracture with significant right upper extremity and chest bruising    *Bipolar disorder  · On Depakote and Seroquel    PLAN:   1. Today I will check labs as noted below.  2. Follow-up in about 3 months with labs    ORDERS PLACED DURING THIS ENCOUNTER  Orders Placed This Encounter   Procedures   • Retic With IRF & RET-He     Order Specific Question:   Release to patient     Answer:   Routine Release   • Immature Platelet Fraction     Order Specific Question:   Release to patient     Answer:   Routine Release   • Vitamin B12     Standing Status:   Future     Standing Expiration Date:   5/15/2024     Order Specific Question:   Release to patient     Answer:   Routine Release   • Folate     Standing Status:   Future     Standing Expiration Date:   5/15/2024     Order Specific Question:   Release to patient     Answer:   Routine Release

## 2023-05-15 NOTE — PROGRESS NOTES
REFERRING PROVIDER:    Kavon Wheat MD  [address] [place] [address]    REASON FOR CONSULTATION:    Macrocytic anemia  Thrombocytopenia    HISTORY OF PRESENT ILLNESS:  Rosalina Ferrell is a 73 y.o. female who is referred today for further evaluation of macrocytic anemia and thrombocytopenia.    Platelets were normal through 11/13/2019 with a platelet count of 151,000 at that time.    Hemoglobin was normal through 2/7/2022 with a hemoglobin of 12.2 at that time.    Labs on 3/21/2023 showed a hemoglobin of 11.5, hematocrit 34.7%, MCV 98.3, platelets 123,000.    She recently fell and fractured her right clavicle.  She states that she had quite a bit of bruising after this fall but that this is improving.  She denies any history of blood disorders.  She is on Seroquel at this point which is a relatively new medication for her.  She has been on Depakote for a long time.  She previously was on lithium but has been off of this for at least a year as it was causing kidney injury.        Past Medical History:   Diagnosis Date   • Bipolar 1 disorder, manic, mild    • Cataract    • Depression    • Fracture, clavicle 4/23/23   • Fracture, fibula 4/23/23   • Hyperlipidemia    • Hypothyroidism    • Low back pain    • Tremor    • Urinary incontinence        Past Surgical History:   Procedure Laterality Date   • BREAST BIOPSY     • CHOLECYSTECTOMY     • COLONOSCOPY     • HYSTERECTOMY     • MOUTH SURGERY      Skin graft   • OOPHORECTOMY         SOCIAL HISTORY:   reports that she has never smoked. She has never been exposed to tobacco smoke. She has never used smokeless tobacco. She reports that she does not drink alcohol and does not use drugs.    FAMILY HISTORY:  family history includes Cancer in her mother; Heart attack in her brother and mother; Heart disease in her brother and brother.    ALLERGIES:  No Known Allergies    MEDICATIONS:  The medication list has been reviewed with the patient by the medical assistant, and  "the list has been updated in the electronic medical record, which I reviewed.  Medication dosages and frequencies were confirmed to be accurate.    Review of Systems   Musculoskeletal:        Pain from recent clavicle fracture   All other systems reviewed and are negative.      PHYSICAL EXAMINATION:  Vitals:    05/15/23 1518   BP: 136/82   Pulse: 57   Resp: 18   Temp: 97.7 °F (36.5 °C)   TempSrc: Temporal   SpO2: 97%   Weight: 61.4 kg (135 lb 4.8 oz)   Height: 162.9 cm (64.13\")   PainSc: 0-No pain     General:  No acute distress, awake, alert and oriented  Skin:  Warm and dry, no visible rash  HEENT:  Normocephalic/atraumatic.   Chest:  Normal respiratory effort  Extremities:  No visible clubbing, cyanosis, or edema.  The right upper extremity is in a sling.  Neuro/psych:  Grossly nonfocal.  Normal mood and affect.    DIAGNOSTIC DATA:  CBC & Differential (05/15/2023 14:29)      IMAGING:    None reviewed    ASSESSMENT:  This is a 73 y.o. female with:    *Mild macrocytic anemia  · Hemoglobin was normal through 2/7/2022 with a hemoglobin of 12.2 at that time.  • 5/15/2023: Hemoglobin is actually normal today despite of recent clavicle fracture and bruising  • The MCV is elevated at 107    *Thrombocytopenia  · Platelets were normal through 11/13/2019 with a platelet count of 151,000 at that time.  · 5/15/2023: Platelets 103,000, down from 123,000  · Consumption of platelets due to recent clavicle fracture and significant bruising after this may be contributing.  I also believe that medications such as Depakote are contributing as well.    *Recent clavicle fracture with significant right upper extremity and chest bruising    *Bipolar disorder  · On Depakote and Seroquel    PLAN:   1. Today I will check labs as noted below.  2. Follow-up in about 3 months with labs    ORDERS PLACED DURING THIS ENCOUNTER  Orders Placed This Encounter   Procedures   • Retic With IRF & RET-He     Order Specific Question:   Release to patient "     Answer:   Routine Release   • Immature Platelet Fraction     Order Specific Question:   Release to patient     Answer:   Routine Release   • Vitamin B12     Standing Status:   Future     Standing Expiration Date:   5/15/2024     Order Specific Question:   Release to patient     Answer:   Routine Release   • Folate     Standing Status:   Future     Standing Expiration Date:   5/15/2024     Order Specific Question:   Release to patient     Answer:   Routine Release

## 2023-05-26 ENCOUNTER — OFFICE VISIT (OUTPATIENT)
Dept: ORTHOPEDIC SURGERY | Facility: CLINIC | Age: 73
End: 2023-05-26
Payer: MEDICARE

## 2023-05-26 VITALS — TEMPERATURE: 97.5 F | HEIGHT: 64 IN | BODY MASS INDEX: 22.72 KG/M2 | WEIGHT: 133.1 LBS

## 2023-05-26 DIAGNOSIS — S42.031D CLOSED DISPLACED FRACTURE OF ACROMIAL END OF RIGHT CLAVICLE WITH ROUTINE HEALING, SUBSEQUENT ENCOUNTER: Primary | ICD-10-CM

## 2023-05-26 DIAGNOSIS — R52 PAIN: ICD-10-CM

## 2023-05-30 RX ORDER — LEVOTHYROXINE SODIUM 0.07 MG/1
TABLET ORAL
Qty: 90 TABLET | Refills: 3 | Status: SHIPPED | OUTPATIENT
Start: 2023-05-30

## 2023-05-30 RX ORDER — SIMVASTATIN 40 MG
TABLET ORAL
Qty: 90 TABLET | Refills: 3 | Status: SHIPPED | OUTPATIENT
Start: 2023-05-30

## 2023-06-06 ENCOUNTER — TREATMENT (OUTPATIENT)
Dept: PHYSICAL THERAPY | Facility: CLINIC | Age: 73
End: 2023-06-06
Payer: MEDICARE

## 2023-06-06 DIAGNOSIS — M25.511 RIGHT SHOULDER PAIN, UNSPECIFIED CHRONICITY: Primary | ICD-10-CM

## 2023-06-06 DIAGNOSIS — S42.021D DISPLACED FRACTURE OF SHAFT OF RIGHT CLAVICLE, SUBSEQUENT ENCOUNTER FOR FRACTURE WITH ROUTINE HEALING: ICD-10-CM

## 2023-06-06 DIAGNOSIS — S42.031D CLOSED DISPLACED FRACTURE OF ACROMIAL END OF RIGHT CLAVICLE WITH ROUTINE HEALING, SUBSEQUENT ENCOUNTER: ICD-10-CM

## 2023-06-06 NOTE — PROGRESS NOTES
Physical Therapy Initial Evaluation and Plan of Care    [facility]  [facility] PHYSICAL THERAPY  [address]  [facility] [address]  [facility] [address]  Dept: [phone]  Dept Fax: [phone]  Loc: [phone]  Loc Fax: [phone]       Patient: Rosalina Ferrell   : 1950  Diagnosis/ICD-10 Code:  No primary diagnosis found.  Referring practitioner: Colton Hou MD  Date of Initial Visit: 2023  Today's Date: 2023  Patient seen for Visit count could not be calculated. Make sure you are using a visit which is associated with an episode. sessions           Subjective Evaluation    History of Present Illness  Date of onset: 2023  Mechanism of injury: Fell off bed (high bed). Bruised all down arm. Broken ribs and clavicle.   MD said to gradually get off sling, try to work out of it. Was told not to push, lift.   Per imaging reports, AC joint is .   Reaching is most painful.   Haven't tried ice. Takes tyelnol intermittently for pain.    Subjective comment: In sling since injury. Per patient, MD said she can now start to wean out and begin to use her arm more, but still not to lift.Pain  Current pain ratin  At best pain ratin  At worst pain ratin  Relieving factors: medications and rest  Aggravating factors: overhead activity, lifting, movement and outstretched reach    Social Support  Lives in: community-based residential facility  Lives with: significant other    Hand dominance: right    Diagnostic Tests  X-ray: abnormal    Patient Goals  Patient goals for therapy: increased strength, independence with ADLs/IADLs, return to sport/leisure activities and decreased pain           Objective          Observations     Right Shoulder  Positive for deformity.     Additional Shoulder Observation Details  Pt presents with sling, forward head, rounded shoulders  R AC joint separation/deformity noted.     Active Range of Motion     Right Elbow   Flexion: 135 degrees    Extension: 18 degrees   Forearm supination: 101 degrees   Forearm pronation: 100 degrees     Passive Range of Motion     Right Shoulder   Flexion: 110 degrees   Abduction: 72 degrees   External rotation 0°: 55 degrees   Internal rotation 0°: 33 degrees     Joint Play     Additional Joint Play Details  Normal mobility noted with gentle testing    Strength/Myotome Testing     Additional Strength Details  NT secondary to recent fracture      See Exercise, Manual, and Modality Logs for complete treatment.       Functional Outcome Score: Quick Dash 11%        Assessment & Plan     Assessment  Impairments: abnormal or restricted ROM, activity intolerance, impaired physical strength, lacks appropriate home exercise program, pain with function and weight-bearing intolerance  Functional Limitations: carrying objects, lifting, sleeping, pulling, pushing, uncomfortable because of pain, reaching behind back and reaching overhead  Assessment details: Rosalina Ferrell is a 73 y.o. year-old female referred to physical therapy s/p R clavicle fracture and AC joint separation. She presents with a evolving clinical presentation.  She has no comorbidities or personal factors that may affect her progress in the plan of care.  Signs and symptoms are consistent with physical therapy diagnosis of R clavicle fracture and AC joint separation and she will benefit from skilled physical therapy to focus on functional strength and mobility.       Prognosis: good    Goals  Plan Goals: STGs to be met by 2 weeks  1.) Pt will be independent and compliant with initial home exercise program.   2.) Pt will report R shoulder pain </= 5/10 to increase ease of bathing and grooming.  3.) Pt will increase R shoulder AROM flexion to 0-100deg with minimal discomfort.     LTGs to be met by 4 weeks  1.) Pt will be independent and compliant with advanced home exercise program.   2.) Pt will report R shoulder pain </= 2-3/10 to increase ease of preparing a  meal.  3.) Pt will increase R shoulder EARNEST to back of occiput and R shoulder flexion to 0-120 deg to increase ease of donning/doffing clothing.   4.) Pt will place 2lb weight on overhead shelf to increase ease of putting away dishes.         Plan  Planned modality interventions: cryotherapy, dry needling, high voltage pulsed current (pain management), hydrotherapy, ultrasound, thermotherapy (hydrocollator packs) and TENS  Planned therapy interventions: manual therapy, abdominal trunk stabilization, flexibility, functional ROM exercises, home exercise program, joint mobilization, therapeutic activities, strengthening, stretching, soft tissue mobilization, spinal/joint mobilization, postural training and neuromuscular re-education  Frequency: 2x week  Duration in visits: 12  Duration in weeks: 20  Treatment plan discussed with: patient  Plan details: Assess response to first session. Gentle advance ROM, functional mobility, wean from sling .      Timed:  Manual Therapy:    8     mins  53721;  Therapeutic Exercise:    16     mins  68667;     Neuromuscular Aleksey:        mins  48118;    Therapeutic Activity:          mins  29587;     Gait Training:           mins  58364;     Ultrasound:          mins  84107;    Iontophoresis         mins 81594      Untimed:  Electrical Stimulation:         mins  37080 ( );  Traction:       mins  61761;   Low Eval     15     Mins  64982  Mod Eval          Mins  59783  High Eval                            Mins  49991  Dry Needling  (1-2 muscles)                 mins 96230 (Self-pay)  Dry Needling (3-4 muscles)      mins 25887 (Self-pay)  Dry Needling Trial         mins DRYNDLTRIAL  (No Charge)      Timed Treatment:   24   mins   Total Treatment:      55  mins    PT SIGNATURE: CORBY Mcnally License Number: [id number]    Electronically signed by Tika Moulton PT, 06/06/23, 10:12 AM EDT    DATE TREATMENT INITIATED: 6/6/2023    Initial Certification  Certification  Period: 9/4/2023  I certify that the therapy services are furnished while this patient is under my care.  The services outlined above are required by this patient, and will be reviewed every 90 days.     PHYSICIAN: Colton Hou MD   NPI: [id number]                                         DATE:     Please sign and return via fax to [phone] Thank you, [facility].

## 2023-06-09 ENCOUNTER — TREATMENT (OUTPATIENT)
Dept: PHYSICAL THERAPY | Facility: CLINIC | Age: 73
End: 2023-06-09
Payer: MEDICARE

## 2023-06-09 DIAGNOSIS — M25.511 RIGHT SHOULDER PAIN, UNSPECIFIED CHRONICITY: Primary | ICD-10-CM

## 2023-06-09 DIAGNOSIS — S42.021D DISPLACED FRACTURE OF SHAFT OF RIGHT CLAVICLE, SUBSEQUENT ENCOUNTER FOR FRACTURE WITH ROUTINE HEALING: ICD-10-CM

## 2023-06-09 NOTE — PROGRESS NOTES
Physical Therapy Daily Treatment Note    [facility]  [facility] PHYSICAL THERAPY  [address]  [address]  [facility] [address]  Dept: [phone]  Dept Fax: [phone]  Loc: [phone]  Loc Fax: [phone]     Patient: Rosalina Ferrell   : 1950  Diagnosis/ICD-10 Code:  No primary diagnosis found.  Referring practitioner: Colton Hou MD  Today's Date: 2023  Patient seen for Visit count could not be calculated. Make sure you are using a visit which is associated with an episode. sessions           Subjective Doing well, not too sore after last time. I've been doing the exercises at home and not wearing sling for while.     Objective   See Exercise, Manual, and Modality Logs for complete treatment.       Assessment/Plan Progressed gentle AAROM, scapular strengthening and patient tolerated it well. Pt guarded with manual therapy, reaches more ROM with AAROM. Pt tolerating weaning out of sling well without increased pain.            Timed:    Manual Therapy:    8     mins  59435;  Therapeutic Exercise:    15     mins  07007;     Neuromuscular Aleksey:        mins  92794;    Therapeutic Activity:          mins  47501;     Gait Training:           mins  06165;     Ultrasound:          mins  88222;    Electrical Stimulation:        mins  62797 ( );  Iontophoresis         mins 14992;  Aquatic Therapy         mins 50392;      Untimed:  Electrical Stimulation:         mins  73253 ( );  Traction:         mins  95810;   Dry Needling  (1-2 muscles)                 mins  (Self-pay)  Dry Needling (3-4 muscles)       (Self-pay)  Dry Needling Trial         DRYNDLTRIAL  (No Charge)    Timed Treatment:   23   mins   Total Treatment:     33   mins    Tika Moulton, PT  Physical Therapist    [place] License:[id number]

## 2023-06-13 ENCOUNTER — TREATMENT (OUTPATIENT)
Dept: PHYSICAL THERAPY | Facility: CLINIC | Age: 73
End: 2023-06-13
Payer: MEDICARE

## 2023-06-13 DIAGNOSIS — S42.021D DISPLACED FRACTURE OF SHAFT OF RIGHT CLAVICLE, SUBSEQUENT ENCOUNTER FOR FRACTURE WITH ROUTINE HEALING: ICD-10-CM

## 2023-06-13 DIAGNOSIS — S42.031D CLOSED DISPLACED FRACTURE OF ACROMIAL END OF RIGHT CLAVICLE WITH ROUTINE HEALING, SUBSEQUENT ENCOUNTER: ICD-10-CM

## 2023-06-13 DIAGNOSIS — M25.511 RIGHT SHOULDER PAIN, UNSPECIFIED CHRONICITY: Primary | ICD-10-CM

## 2023-06-13 NOTE — PROGRESS NOTES
"Physical Therapy Daily Treatment Note  [address] [facility] [address] [place] [id number]  P: [phone]  F: [phone]    Patient: Rosalina Ferrell   : 1950  Referring practitioner: Colton Hou MD  Date of Initial Visit: Type: THERAPY  Noted: 2023  Today's Date: 2023  Patient seen for 3 sessions       Visit Diagnoses:    ICD-10-CM ICD-9-CM   1. Right shoulder pain, unspecified chronicity  M25.511 719.41   2. Displaced fracture of shaft of right clavicle, subsequent encounter for fracture with routine healing  S42.021D V54.19   3. Closed displaced fracture of acromial end of right clavicle with routine healing, subsequent encounter  S42.031D V54.11       Rosalina Ferrell reports:     Subjective   Pt reports \"my arm is feeling okay today\"  Objective   See Exercise, Manual, and Modality Logs for complete treatment.       Assessment/Plan  Pt home exercise program progressed to include resisted rows with light band resistance. Pt able to perform well without adverse reactions. She tolerated all remaining interventions well, she benefits from v/c and t/c to ensure proper exercise performance. Continue to progress towards goals.     Timed:         Manual Therapy:    0     mins  75954;     Therapeutic Exercise:    30     mins  38692;     Neuromuscular Aleksey:    0    mins  77129;    Therapeutic Activity:     8     mins  72136;     Gait Trainin     mins  17661;     Ultrasound:     0     mins  17749;    Ionto                               0    mins   67441  Self Care                       0     mins   53665  Canalith Repos    0     mins 78101      Un-Timed:  Electrical Stimulation:    0     mins  93565 ( );  Dry Needling     0     mins self-pay  Traction     0     mins 42323      Timed Treatment:   38   mins   Total Treatment:     48   mins    Roshni Venegas PT  [place] License: [id number]                  "

## 2023-06-15 ENCOUNTER — TREATMENT (OUTPATIENT)
Dept: PHYSICAL THERAPY | Facility: CLINIC | Age: 73
End: 2023-06-15
Payer: MEDICARE

## 2023-06-15 DIAGNOSIS — S42.031D CLOSED DISPLACED FRACTURE OF ACROMIAL END OF RIGHT CLAVICLE WITH ROUTINE HEALING, SUBSEQUENT ENCOUNTER: ICD-10-CM

## 2023-06-15 DIAGNOSIS — S42.021D DISPLACED FRACTURE OF SHAFT OF RIGHT CLAVICLE, SUBSEQUENT ENCOUNTER FOR FRACTURE WITH ROUTINE HEALING: ICD-10-CM

## 2023-06-15 DIAGNOSIS — M25.511 RIGHT SHOULDER PAIN, UNSPECIFIED CHRONICITY: Primary | ICD-10-CM

## 2023-06-15 NOTE — PROGRESS NOTES
"Physical Therapy Daily Treatment Note  [address] [facility] [address] [place], [place] [id number]  P: [phone]  F: [phone]    Patient: Rosalina Ferrell   : 1950  Referring practitioner: Colton Hou MD  Date of Initial Visit: Type: THERAPY  Noted: 2023  Today's Date: 6/15/2023  Patient seen for 4 sessions       Visit Diagnoses:    ICD-10-CM ICD-9-CM   1. Right shoulder pain, unspecified chronicity  M25.511 719.41   2. Displaced fracture of shaft of right clavicle, subsequent encounter for fracture with routine healing  S42.021D V54.19   3. Closed displaced fracture of acromial end of right clavicle with routine healing, subsequent encounter  S42.031D V54.11       Rosalina Ferrell reports:     Subjective   Pt reports that her right shoulder feels \"okay\" today, but that her right bra strap will not stay on to her shoulder because she feels like she can't hold her shoulder up.  Objective   See Exercise, Manual, and Modality Logs for complete treatment.       Assessment/Plan  Pt had good passive mobility with the most limitation in internal rotation on R side. Pt reported no pain in R shoulder throughout therapy interventions. During standing rows and B shld ext, she benefits from t/c at scap retractors to encourage increased activation and upright posture. Continue POC.    Timed:         Manual Therapy:    10    mins  69794;     Therapeutic Exercise:    20     mins  95282;     Neuromuscular Aleksey:   0     mins  39219;    Therapeutic Activity:     10     mins  28588;     Gait Trainin     mins  75376;     Ultrasound:     0     mins  96544;    Ionto                               0    mins   98745  Self Care                       0     mins   62852  Canalith Repos    0     mins 57533      Un-Timed:  Electrical Stimulation:    0     mins  81995 (MC );  Dry Needling     0     mins self-pay  Traction     0     mins 01133      Timed Treatment:   40   mins   Total Treatment:     40   mins    Roshni " Rubi, PT  [place] License: [id number]

## 2023-06-16 ENCOUNTER — HOSPITAL ENCOUNTER (OUTPATIENT)
Dept: MAMMOGRAPHY | Facility: HOSPITAL | Age: 73
Discharge: HOME | End: 2023-06-16
Admitting: INTERNAL MEDICINE
Payer: MEDICARE

## 2023-06-16 DIAGNOSIS — Z12.31 SCREENING MAMMOGRAM FOR BREAST CANCER: ICD-10-CM

## 2023-06-16 PROCEDURE — 77063 BREAST TOMOSYNTHESIS BI: CPT

## 2023-06-16 PROCEDURE — 77067 SCR MAMMO BI INCL CAD: CPT

## 2023-07-24 ENCOUNTER — TREATMENT (OUTPATIENT)
Dept: PHYSICAL THERAPY | Facility: CLINIC | Age: 73
End: 2023-07-24
Payer: MEDICARE

## 2023-07-24 DIAGNOSIS — M25.511 RIGHT SHOULDER PAIN, UNSPECIFIED CHRONICITY: Primary | ICD-10-CM

## 2023-07-24 DIAGNOSIS — S42.021D DISPLACED FRACTURE OF SHAFT OF RIGHT CLAVICLE, SUBSEQUENT ENCOUNTER FOR FRACTURE WITH ROUTINE HEALING: ICD-10-CM

## 2023-07-24 DIAGNOSIS — S42.031D CLOSED DISPLACED FRACTURE OF ACROMIAL END OF RIGHT CLAVICLE WITH ROUTINE HEALING, SUBSEQUENT ENCOUNTER: ICD-10-CM

## 2023-07-24 PROCEDURE — 97110 THERAPEUTIC EXERCISES: CPT | Performed by: PHYSICAL THERAPIST

## 2023-07-24 PROCEDURE — 97530 THERAPEUTIC ACTIVITIES: CPT | Performed by: PHYSICAL THERAPIST

## 2023-08-04 NOTE — PROGRESS NOTES
"[facility] GROUP OUTPATIENT FOLLOW UP CLINIC VISIT    REASON FOR FOLLOW-UP:    Macrocytic anemia  Thrombocytopenia    HISTORY OF PRESENT ILLNESS:  Rosalina Ferrell is a 73 y.o. female who returns today for follow up of the above issue.      She denies any bleeding.  She is doing reasonably well.  She continues to have some decreased mobility of the right shoulder following her clavicle fracture but range of motion is improving.    REVIEW OF SYSTEMS:  As per HPI    PHYSICAL EXAMINATION:    Vitals:    08/07/23 1541   BP: 154/86   Pulse: 57   Resp: 18   Temp: 98 øF (36.7 øC)   TempSrc: Temporal   SpO2: 100%   Weight: 61.2 kg (134 lb 14.4 oz)   Height: 162.6 cm (64.02\")   PainSc: 0-No pain       General:  No acute distress, awake, alert and oriented  Skin:  Warm and dry, no visible rash  HEENT:  Normocephalic/atraumatic.    Chest:  Normal respiratory effort  Extremities:  No visible clubbing, cyanosis, or edema  Neuro/psych:  Grossly nonfocal.  Normal mood and affect.        DIAGNOSTIC DATA:  Immature Platelet Fraction (08/07/2023 15:26)  Retic With IRF & RET-He (08/07/2023 15:26)  CBC & Differential (08/07/2023 15:26)    IMAGING:    None reviewed    ASSESSMENT:  This is a 73 y.o. female with:    *Mild macrocytic anemia, now not anemic but macrocytosis persists  Hemoglobin was normal through 2/7/2022 with a hemoglobin of 12.2 at that time.  3/21/2023: TSH 3.36.  5/15/2023: Hemoglobin is actually normal despite of recent clavicle fracture and bruising  5/15/2023: Vitamin B12 normal at 476 and folic acid normal at 15.8.  8/7/2023: Hemoglobin 12.2, .5.  Medication may be contributing to the macrocytosis.     *Thrombocytopenia  Platelets were normal through 11/13/2019 with a platelet count of 151,000 at that time.  5/15/2023: Initial evaluation. Platelets 103,000, down from 123,000. IPF 8.7%. Vitamin B12 476. Folate 15.8.   Consumption of platelets due to recent clavicle fracture and significant bruising after " this may be contributing.  I also believe that medications such as Depakote are contributing as well.  8/7/2023: Platelets stable at 106,000.  Immature platelet fraction mildly elevated at 7.2%.     *Recent clavicle fracture with significant right upper extremity and chest bruising.  Range of motion is improving.     *Bipolar disorder  On Depakote and Seroquel     PLAN:   Continue intermittent monitoring.  Follow-up in 6 months with labs.  We can simply see her back sooner if needed.  I encouraged her and her son today to monitor closely for any bleeding or excess bruising and to notify us if this occurs although I do not anticipate that it will.

## 2023-08-07 ENCOUNTER — OFFICE VISIT (OUTPATIENT)
Dept: ONCOLOGY | Facility: CLINIC | Age: 73
End: 2023-08-07
Payer: MEDICARE

## 2023-08-07 ENCOUNTER — LAB (OUTPATIENT)
Dept: LAB | Facility: HOSPITAL | Age: 73
End: 2023-08-07
Payer: MEDICARE

## 2023-08-07 VITALS
RESPIRATION RATE: 18 BRPM | BODY MASS INDEX: 23.03 KG/M2 | OXYGEN SATURATION: 100 % | SYSTOLIC BLOOD PRESSURE: 154 MMHG | WEIGHT: 134.9 LBS | DIASTOLIC BLOOD PRESSURE: 86 MMHG | HEIGHT: 64 IN | HEART RATE: 57 BPM | TEMPERATURE: 98 F

## 2023-08-07 DIAGNOSIS — D75.89 MACROCYTOSIS: ICD-10-CM

## 2023-08-07 DIAGNOSIS — D69.6 THROMBOCYTOPENIA: Primary | ICD-10-CM

## 2023-08-07 DIAGNOSIS — D69.6 THROMBOCYTOPENIA: ICD-10-CM

## 2023-08-07 LAB
BASOPHILS # BLD AUTO: 0.03 10*3/MM3 (ref 0–0.2)
BASOPHILS NFR BLD AUTO: 0.6 % (ref 0–1.5)
DEPRECATED RDW RBC AUTO: 50.4 FL (ref 37–54)
EOSINOPHIL # BLD AUTO: 0.06 10*3/MM3 (ref 0–0.4)
EOSINOPHIL NFR BLD AUTO: 1.2 % (ref 0.3–6.2)
ERYTHROCYTE [DISTWIDTH] IN BLOOD BY AUTOMATED COUNT: 13 % (ref 12.3–15.4)
HCT VFR BLD AUTO: 39.1 % (ref 34–46.6)
HGB BLD-MCNC: 12.2 G/DL (ref 12–15.9)
HGB RETIC QN AUTO: 35.1 PG (ref 29.8–36.1)
IMM GRANULOCYTES # BLD AUTO: 0.01 10*3/MM3 (ref 0–0.05)
IMM GRANULOCYTES NFR BLD AUTO: 0.2 % (ref 0–0.5)
IMM RETICS NFR: 9.8 % (ref 3–15.8)
LYMPHOCYTES # BLD AUTO: 1.89 10*3/MM3 (ref 0.7–3.1)
LYMPHOCYTES NFR BLD AUTO: 37.4 % (ref 19.6–45.3)
MCH RBC QN AUTO: 32.6 PG (ref 26.6–33)
MCHC RBC AUTO-ENTMCNC: 31.2 G/DL (ref 31.5–35.7)
MCV RBC AUTO: 104.5 FL (ref 79–97)
MONOCYTES # BLD AUTO: 0.53 10*3/MM3 (ref 0.1–0.9)
MONOCYTES NFR BLD AUTO: 10.5 % (ref 5–12)
NEUTROPHILS NFR BLD AUTO: 2.54 10*3/MM3 (ref 1.7–7)
NEUTROPHILS NFR BLD AUTO: 50.1 % (ref 42.7–76)
NRBC BLD AUTO-RTO: 0 /100 WBC (ref 0–0.2)
PLATELET # BLD AUTO: 106 10*3/MM3 (ref 140–450)
PLATELETS.RETICULATED NFR BLD AUTO: 7.2 % (ref 0.9–6.5)
PMV BLD AUTO: 10.7 FL (ref 6–12)
RBC # BLD AUTO: 3.74 10*6/MM3 (ref 3.77–5.28)
RETICS # AUTO: 0.06 10*6/MM3 (ref 0.02–0.13)
RETICS/RBC NFR AUTO: 1.55 % (ref 0.7–1.9)
WBC NRBC COR # BLD: 5.06 10*3/MM3 (ref 3.4–10.8)

## 2023-08-07 PROCEDURE — 3079F DIAST BP 80-89 MM HG: CPT | Performed by: INTERNAL MEDICINE

## 2023-08-07 PROCEDURE — 3077F SYST BP >= 140 MM HG: CPT | Performed by: INTERNAL MEDICINE

## 2023-08-07 PROCEDURE — 99214 OFFICE O/P EST MOD 30 MIN: CPT | Performed by: INTERNAL MEDICINE

## 2023-08-07 PROCEDURE — 85025 COMPLETE CBC W/AUTO DIFF WBC: CPT

## 2023-08-07 PROCEDURE — 1159F MED LIST DOCD IN RCRD: CPT | Performed by: INTERNAL MEDICINE

## 2023-08-07 PROCEDURE — 1160F RVW MEDS BY RX/DR IN RCRD: CPT | Performed by: INTERNAL MEDICINE

## 2023-08-07 PROCEDURE — 85046 RETICYTE/HGB CONCENTRATE: CPT

## 2023-08-07 PROCEDURE — 36415 COLL VENOUS BLD VENIPUNCTURE: CPT

## 2023-08-07 PROCEDURE — 85055 RETICULATED PLATELET ASSAY: CPT

## 2023-08-07 PROCEDURE — 1126F AMNT PAIN NOTED NONE PRSNT: CPT | Performed by: INTERNAL MEDICINE

## 2023-10-25 RX ORDER — ALENDRONATE SODIUM 70 MG/1
70 TABLET ORAL
Qty: 12 TABLET | Refills: 10 | Status: SHIPPED | OUTPATIENT
Start: 2023-10-25

## 2023-12-07 ENCOUNTER — TELEPHONE (OUTPATIENT)
Dept: ONCOLOGY | Facility: CLINIC | Age: 73
End: 2023-12-07

## 2023-12-07 DIAGNOSIS — D69.6 THROMBOCYTOPENIA: Primary | ICD-10-CM

## 2023-12-07 DIAGNOSIS — D75.89 MACROCYTOSIS: ICD-10-CM

## 2023-12-07 NOTE — TELEPHONE ENCOUNTER
Caller: Lorenzo Ferrell    Relationship to patient: Emergency Contact    Best call back number: [phone]    Chief complaint: CANCEL    Type of visit: LAB AND F/U 2-    Additional notes: CANCELING PT WILL BE SWITCHING DOCTORS DUE TO INSURANCE

## 2024-06-10 ENCOUNTER — TRANSCRIBE ORDERS (OUTPATIENT)
Dept: ADMINISTRATIVE | Facility: HOSPITAL | Age: 74
End: 2024-06-10
Payer: MEDICARE

## 2024-06-10 DIAGNOSIS — Z12.31 SCREENING MAMMOGRAM, ENCOUNTER FOR: Primary | ICD-10-CM

## 2024-06-19 ENCOUNTER — HOSPITAL ENCOUNTER (OUTPATIENT)
Dept: MAMMOGRAPHY | Facility: HOSPITAL | Age: 74
Discharge: HOME | End: 2024-06-19
Admitting: FAMILY MEDICINE
Payer: MEDICARE

## 2024-06-19 DIAGNOSIS — Z12.31 SCREENING MAMMOGRAM, ENCOUNTER FOR: ICD-10-CM

## 2024-06-19 PROCEDURE — 77067 SCR MAMMO BI INCL CAD: CPT

## 2024-06-19 PROCEDURE — 77063 BREAST TOMOSYNTHESIS BI: CPT

## 2024-06-20 RX ORDER — LEVOTHYROXINE SODIUM 0.07 MG/1
75 TABLET ORAL DAILY
Qty: 30 TABLET | Refills: 0 | Status: SHIPPED | OUTPATIENT
Start: 2024-06-20

## 2024-11-18 RX ORDER — ALENDRONATE SODIUM 70 MG/1
70 TABLET ORAL
Qty: 12 TABLET | Refills: 3 | Status: SHIPPED | OUTPATIENT
Start: 2024-11-18

## 2025-05-07 ENCOUNTER — TRANSCRIBE ORDERS (OUTPATIENT)
Dept: ADMINISTRATIVE | Facility: HOSPITAL | Age: 75
End: 2025-05-07
Payer: MEDICARE

## 2025-05-07 DIAGNOSIS — Z12.31 BREAST CANCER SCREENING BY MAMMOGRAM: Primary | ICD-10-CM

## 2025-08-28 RX ORDER — ALENDRONATE SODIUM 70 MG/1
70 TABLET ORAL
Qty: 12 TABLET | Refills: 3 | OUTPATIENT
Start: 2025-08-28